# Patient Record
Sex: FEMALE | Race: WHITE | Employment: OTHER
[De-identification: names, ages, dates, MRNs, and addresses within clinical notes are randomized per-mention and may not be internally consistent; named-entity substitution may affect disease eponyms.]

---

## 2017-02-10 ENCOUNTER — OFFICE VISIT (OUTPATIENT)
Dept: SURGERY | Facility: CLINIC | Age: 67
End: 2017-02-10

## 2017-02-10 ENCOUNTER — TELEPHONE (OUTPATIENT)
Dept: INFUSION THERAPY | Facility: MEDICAL CENTER | Age: 67
End: 2017-02-10

## 2017-02-10 VITALS
HEART RATE: 80 BPM | HEIGHT: 60 IN | DIASTOLIC BLOOD PRESSURE: 75 MMHG | RESPIRATION RATE: 18 BRPM | BODY MASS INDEX: 26.5 KG/M2 | SYSTOLIC BLOOD PRESSURE: 127 MMHG | WEIGHT: 135 LBS | OXYGEN SATURATION: 99 % | TEMPERATURE: 98.4 F

## 2017-02-10 DIAGNOSIS — Z01.818 PREOP TESTING: Primary | ICD-10-CM

## 2017-02-10 DIAGNOSIS — C50.912 INVASIVE DUCTAL CARCINOMA OF BREAST, LEFT (HCC): ICD-10-CM

## 2017-02-10 PROCEDURE — G8420 CALC BMI NORM PARAMETERS: HCPCS | Performed by: SURGERY

## 2017-02-10 PROCEDURE — 1090F PRES/ABSN URINE INCON ASSESS: CPT | Performed by: SURGERY

## 2017-02-10 PROCEDURE — 4040F PNEUMOC VAC/ADMIN/RCVD: CPT | Performed by: SURGERY

## 2017-02-10 PROCEDURE — 1123F ACP DISCUSS/DSCN MKR DOCD: CPT | Performed by: SURGERY

## 2017-02-10 PROCEDURE — 3017F COLORECTAL CA SCREEN DOC REV: CPT | Performed by: SURGERY

## 2017-02-10 PROCEDURE — 99203 OFFICE O/P NEW LOW 30 MIN: CPT | Performed by: SURGERY

## 2017-02-10 PROCEDURE — 3014F SCREEN MAMMO DOC REV: CPT | Performed by: SURGERY

## 2017-02-10 PROCEDURE — G8484 FLU IMMUNIZE NO ADMIN: HCPCS | Performed by: SURGERY

## 2017-02-10 PROCEDURE — 1036F TOBACCO NON-USER: CPT | Performed by: SURGERY

## 2017-02-10 PROCEDURE — G8427 DOCREV CUR MEDS BY ELIG CLIN: HCPCS | Performed by: SURGERY

## 2017-02-10 PROCEDURE — G8400 PT W/DXA NO RESULTS DOC: HCPCS | Performed by: SURGERY

## 2017-02-15 ENCOUNTER — OFFICE VISIT (OUTPATIENT)
Dept: ONCOLOGY | Facility: CLINIC | Age: 67
End: 2017-02-15

## 2017-02-15 VITALS
SYSTOLIC BLOOD PRESSURE: 142 MMHG | BODY MASS INDEX: 23.05 KG/M2 | HEART RATE: 78 BPM | RESPIRATION RATE: 16 BRPM | TEMPERATURE: 97.4 F | DIASTOLIC BLOOD PRESSURE: 75 MMHG | WEIGHT: 135 LBS | HEIGHT: 64 IN

## 2017-02-15 DIAGNOSIS — C50.412 MALIGNANT NEOPLASM OF UPPER-OUTER QUADRANT OF LEFT FEMALE BREAST (HCC): ICD-10-CM

## 2017-02-15 PROCEDURE — G8427 DOCREV CUR MEDS BY ELIG CLIN: HCPCS | Performed by: INTERNAL MEDICINE

## 2017-02-15 PROCEDURE — 4040F PNEUMOC VAC/ADMIN/RCVD: CPT | Performed by: INTERNAL MEDICINE

## 2017-02-15 PROCEDURE — 1090F PRES/ABSN URINE INCON ASSESS: CPT | Performed by: INTERNAL MEDICINE

## 2017-02-15 PROCEDURE — 99205 OFFICE O/P NEW HI 60 MIN: CPT | Performed by: INTERNAL MEDICINE

## 2017-02-15 PROCEDURE — G8420 CALC BMI NORM PARAMETERS: HCPCS | Performed by: INTERNAL MEDICINE

## 2017-02-15 PROCEDURE — G8484 FLU IMMUNIZE NO ADMIN: HCPCS | Performed by: INTERNAL MEDICINE

## 2017-02-15 PROCEDURE — 1036F TOBACCO NON-USER: CPT | Performed by: INTERNAL MEDICINE

## 2017-02-15 PROCEDURE — 3014F SCREEN MAMMO DOC REV: CPT | Performed by: INTERNAL MEDICINE

## 2017-02-15 PROCEDURE — 3017F COLORECTAL CA SCREEN DOC REV: CPT | Performed by: INTERNAL MEDICINE

## 2017-03-14 ENCOUNTER — OFFICE VISIT (OUTPATIENT)
Dept: SURGERY | Age: 67
End: 2017-03-14

## 2017-03-14 VITALS
HEIGHT: 64 IN | DIASTOLIC BLOOD PRESSURE: 77 MMHG | SYSTOLIC BLOOD PRESSURE: 137 MMHG | TEMPERATURE: 97.5 F | BODY MASS INDEX: 23.39 KG/M2 | OXYGEN SATURATION: 99 % | RESPIRATION RATE: 18 BRPM | HEART RATE: 88 BPM | WEIGHT: 137 LBS

## 2017-03-14 DIAGNOSIS — Z09 POSTOP CHECK: Primary | ICD-10-CM

## 2017-03-14 PROCEDURE — 99024 POSTOP FOLLOW-UP VISIT: CPT | Performed by: SURGERY

## 2017-03-15 ENCOUNTER — OFFICE VISIT (OUTPATIENT)
Dept: ONCOLOGY | Age: 67
End: 2017-03-15
Payer: MEDICARE

## 2017-03-15 VITALS
HEIGHT: 64 IN | DIASTOLIC BLOOD PRESSURE: 69 MMHG | RESPIRATION RATE: 16 BRPM | BODY MASS INDEX: 23.39 KG/M2 | HEART RATE: 79 BPM | TEMPERATURE: 98.5 F | WEIGHT: 137 LBS | SYSTOLIC BLOOD PRESSURE: 130 MMHG

## 2017-03-15 DIAGNOSIS — C50.412 MALIGNANT NEOPLASM OF UPPER-OUTER QUADRANT OF LEFT FEMALE BREAST (HCC): Primary | ICD-10-CM

## 2017-03-15 PROCEDURE — G8400 PT W/DXA NO RESULTS DOC: HCPCS | Performed by: INTERNAL MEDICINE

## 2017-03-15 PROCEDURE — 1036F TOBACCO NON-USER: CPT | Performed by: INTERNAL MEDICINE

## 2017-03-15 PROCEDURE — 1123F ACP DISCUSS/DSCN MKR DOCD: CPT | Performed by: INTERNAL MEDICINE

## 2017-03-15 PROCEDURE — 1090F PRES/ABSN URINE INCON ASSESS: CPT | Performed by: INTERNAL MEDICINE

## 2017-03-15 PROCEDURE — G8420 CALC BMI NORM PARAMETERS: HCPCS | Performed by: INTERNAL MEDICINE

## 2017-03-15 PROCEDURE — 3014F SCREEN MAMMO DOC REV: CPT | Performed by: INTERNAL MEDICINE

## 2017-03-15 PROCEDURE — 4040F PNEUMOC VAC/ADMIN/RCVD: CPT | Performed by: INTERNAL MEDICINE

## 2017-03-15 PROCEDURE — 99214 OFFICE O/P EST MOD 30 MIN: CPT | Performed by: INTERNAL MEDICINE

## 2017-03-15 PROCEDURE — G8484 FLU IMMUNIZE NO ADMIN: HCPCS | Performed by: INTERNAL MEDICINE

## 2017-03-15 PROCEDURE — G8427 DOCREV CUR MEDS BY ELIG CLIN: HCPCS | Performed by: INTERNAL MEDICINE

## 2017-03-15 PROCEDURE — 3017F COLORECTAL CA SCREEN DOC REV: CPT | Performed by: INTERNAL MEDICINE

## 2017-03-15 RX ORDER — DIPHENHYDRAMINE HCL 25 MG
25 TABLET ORAL NIGHTLY PRN
COMMUNITY
End: 2017-04-05 | Stop reason: ALTCHOICE

## 2017-03-16 ENCOUNTER — TELEPHONE (OUTPATIENT)
Dept: SURGERY | Age: 67
End: 2017-03-16

## 2017-03-21 ENCOUNTER — OFFICE VISIT (OUTPATIENT)
Dept: SURGERY | Age: 67
End: 2017-03-21

## 2017-03-21 VITALS
TEMPERATURE: 97.2 F | BODY MASS INDEX: 23.47 KG/M2 | OXYGEN SATURATION: 95 % | SYSTOLIC BLOOD PRESSURE: 131 MMHG | HEIGHT: 64 IN | DIASTOLIC BLOOD PRESSURE: 80 MMHG | HEART RATE: 83 BPM | WEIGHT: 137.5 LBS | RESPIRATION RATE: 18 BRPM

## 2017-03-21 DIAGNOSIS — Z09 POSTOP CHECK: Primary | ICD-10-CM

## 2017-03-21 PROCEDURE — 99024 POSTOP FOLLOW-UP VISIT: CPT | Performed by: SURGERY

## 2017-03-28 ENCOUNTER — TELEPHONE (OUTPATIENT)
Dept: ONCOLOGY | Age: 67
End: 2017-03-28

## 2017-03-28 DIAGNOSIS — C50.412 MALIGNANT NEOPLASM OF UPPER-OUTER QUADRANT OF LEFT FEMALE BREAST (HCC): Primary | ICD-10-CM

## 2017-04-05 ENCOUNTER — OFFICE VISIT (OUTPATIENT)
Dept: ONCOLOGY | Age: 67
End: 2017-04-05
Payer: MEDICARE

## 2017-04-05 VITALS
SYSTOLIC BLOOD PRESSURE: 146 MMHG | RESPIRATION RATE: 20 BRPM | WEIGHT: 136 LBS | HEART RATE: 83 BPM | HEIGHT: 64 IN | BODY MASS INDEX: 23.22 KG/M2 | TEMPERATURE: 98.2 F | DIASTOLIC BLOOD PRESSURE: 74 MMHG

## 2017-04-05 DIAGNOSIS — M85.80 OSTEOPENIA: ICD-10-CM

## 2017-04-05 DIAGNOSIS — M81.0 AGE-RELATED OSTEOPOROSIS WITHOUT CURRENT PATHOLOGICAL FRACTURE: ICD-10-CM

## 2017-04-05 DIAGNOSIS — C50.412 MALIGNANT NEOPLASM OF UPPER-OUTER QUADRANT OF LEFT FEMALE BREAST (HCC): Primary | ICD-10-CM

## 2017-04-05 PROCEDURE — 3014F SCREEN MAMMO DOC REV: CPT | Performed by: INTERNAL MEDICINE

## 2017-04-05 PROCEDURE — 1090F PRES/ABSN URINE INCON ASSESS: CPT | Performed by: INTERNAL MEDICINE

## 2017-04-05 PROCEDURE — G8427 DOCREV CUR MEDS BY ELIG CLIN: HCPCS | Performed by: INTERNAL MEDICINE

## 2017-04-05 PROCEDURE — 99214 OFFICE O/P EST MOD 30 MIN: CPT | Performed by: INTERNAL MEDICINE

## 2017-04-05 PROCEDURE — 4005F PHARM THX FOR OP RXD: CPT | Performed by: INTERNAL MEDICINE

## 2017-04-05 PROCEDURE — 1036F TOBACCO NON-USER: CPT | Performed by: INTERNAL MEDICINE

## 2017-04-05 PROCEDURE — G8400 PT W/DXA NO RESULTS DOC: HCPCS | Performed by: INTERNAL MEDICINE

## 2017-04-05 PROCEDURE — 3017F COLORECTAL CA SCREEN DOC REV: CPT | Performed by: INTERNAL MEDICINE

## 2017-04-05 PROCEDURE — 4040F PNEUMOC VAC/ADMIN/RCVD: CPT | Performed by: INTERNAL MEDICINE

## 2017-04-05 PROCEDURE — 1123F ACP DISCUSS/DSCN MKR DOCD: CPT | Performed by: INTERNAL MEDICINE

## 2017-04-05 PROCEDURE — G8420 CALC BMI NORM PARAMETERS: HCPCS | Performed by: INTERNAL MEDICINE

## 2017-04-05 RX ORDER — ANASTROZOLE 1 MG/1
1 TABLET ORAL DAILY
Qty: 30 TABLET | Refills: 11 | Status: SHIPPED | OUTPATIENT
Start: 2017-04-05 | End: 2019-03-23 | Stop reason: SDUPTHER

## 2017-04-12 ENCOUNTER — HOSPITAL ENCOUNTER (OUTPATIENT)
Dept: BONE DENSITY | Age: 67
Discharge: HOME | End: 2017-04-12
Payer: MEDICARE

## 2017-04-12 DIAGNOSIS — M81.0 AGE-RELATED OSTEOPOROSIS WITHOUT CURRENT PATHOLOGICAL FRACTURE: ICD-10-CM

## 2017-04-12 DIAGNOSIS — M85.80 OSTEOPENIA: ICD-10-CM

## 2017-04-12 DIAGNOSIS — C50.412 MALIGNANT NEOPLASM OF UPPER-OUTER QUADRANT OF LEFT FEMALE BREAST (HCC): ICD-10-CM

## 2017-04-12 PROCEDURE — 77080 DXA BONE DENSITY AXIAL: CPT

## 2017-04-18 ENCOUNTER — OFFICE VISIT (OUTPATIENT)
Dept: SURGERY | Age: 67
End: 2017-04-18

## 2017-04-18 VITALS
DIASTOLIC BLOOD PRESSURE: 74 MMHG | HEART RATE: 80 BPM | SYSTOLIC BLOOD PRESSURE: 123 MMHG | BODY MASS INDEX: 23.52 KG/M2 | TEMPERATURE: 98 F | RESPIRATION RATE: 18 BRPM | OXYGEN SATURATION: 99 % | WEIGHT: 137.8 LBS | HEIGHT: 64 IN

## 2017-04-18 DIAGNOSIS — Z09 POSTOP CHECK: Primary | ICD-10-CM

## 2017-04-18 DIAGNOSIS — C50.912 INVASIVE DUCTAL CARCINOMA OF BREAST, LEFT (HCC): ICD-10-CM

## 2017-04-18 PROCEDURE — 99024 POSTOP FOLLOW-UP VISIT: CPT | Performed by: SURGERY

## 2017-06-07 ENCOUNTER — OFFICE VISIT (OUTPATIENT)
Dept: ONCOLOGY | Age: 67
End: 2017-06-07
Payer: MEDICARE

## 2017-06-07 VITALS
DIASTOLIC BLOOD PRESSURE: 73 MMHG | HEART RATE: 78 BPM | HEIGHT: 64 IN | WEIGHT: 139 LBS | BODY MASS INDEX: 23.73 KG/M2 | RESPIRATION RATE: 16 BRPM | TEMPERATURE: 97.6 F | SYSTOLIC BLOOD PRESSURE: 144 MMHG

## 2017-06-07 DIAGNOSIS — C50.412 MALIGNANT NEOPLASM OF UPPER-OUTER QUADRANT OF LEFT FEMALE BREAST (HCC): Primary | ICD-10-CM

## 2017-06-07 PROCEDURE — 1090F PRES/ABSN URINE INCON ASSESS: CPT | Performed by: INTERNAL MEDICINE

## 2017-06-07 PROCEDURE — G8420 CALC BMI NORM PARAMETERS: HCPCS | Performed by: INTERNAL MEDICINE

## 2017-06-07 PROCEDURE — G8399 PT W/DXA RESULTS DOCUMENT: HCPCS | Performed by: INTERNAL MEDICINE

## 2017-06-07 PROCEDURE — 1036F TOBACCO NON-USER: CPT | Performed by: INTERNAL MEDICINE

## 2017-06-07 PROCEDURE — 3014F SCREEN MAMMO DOC REV: CPT | Performed by: INTERNAL MEDICINE

## 2017-06-07 PROCEDURE — G8427 DOCREV CUR MEDS BY ELIG CLIN: HCPCS | Performed by: INTERNAL MEDICINE

## 2017-06-07 PROCEDURE — 3017F COLORECTAL CA SCREEN DOC REV: CPT | Performed by: INTERNAL MEDICINE

## 2017-06-07 PROCEDURE — 4040F PNEUMOC VAC/ADMIN/RCVD: CPT | Performed by: INTERNAL MEDICINE

## 2017-06-07 PROCEDURE — 99214 OFFICE O/P EST MOD 30 MIN: CPT | Performed by: INTERNAL MEDICINE

## 2017-06-07 PROCEDURE — 1123F ACP DISCUSS/DSCN MKR DOCD: CPT | Performed by: INTERNAL MEDICINE

## 2017-06-07 RX ORDER — CIPROFLOXACIN 500 MG/1
500 TABLET, FILM COATED ORAL 2 TIMES DAILY
COMMUNITY
End: 2017-06-29 | Stop reason: ALTCHOICE

## 2017-09-06 ENCOUNTER — OFFICE VISIT (OUTPATIENT)
Dept: ONCOLOGY | Age: 67
End: 2017-09-06
Payer: MEDICARE

## 2017-09-06 VITALS
HEIGHT: 64 IN | SYSTOLIC BLOOD PRESSURE: 130 MMHG | TEMPERATURE: 97.5 F | HEART RATE: 80 BPM | WEIGHT: 140.8 LBS | BODY MASS INDEX: 24.04 KG/M2 | RESPIRATION RATE: 16 BRPM | DIASTOLIC BLOOD PRESSURE: 73 MMHG

## 2017-09-06 DIAGNOSIS — C50.412 MALIGNANT NEOPLASM OF UPPER-OUTER QUADRANT OF LEFT FEMALE BREAST (HCC): Primary | ICD-10-CM

## 2017-09-06 PROCEDURE — 3017F COLORECTAL CA SCREEN DOC REV: CPT | Performed by: INTERNAL MEDICINE

## 2017-09-06 PROCEDURE — G8399 PT W/DXA RESULTS DOCUMENT: HCPCS | Performed by: INTERNAL MEDICINE

## 2017-09-06 PROCEDURE — G8427 DOCREV CUR MEDS BY ELIG CLIN: HCPCS | Performed by: INTERNAL MEDICINE

## 2017-09-06 PROCEDURE — 1036F TOBACCO NON-USER: CPT | Performed by: INTERNAL MEDICINE

## 2017-09-06 PROCEDURE — G8420 CALC BMI NORM PARAMETERS: HCPCS | Performed by: INTERNAL MEDICINE

## 2017-09-06 PROCEDURE — 4040F PNEUMOC VAC/ADMIN/RCVD: CPT | Performed by: INTERNAL MEDICINE

## 2017-09-06 PROCEDURE — 1123F ACP DISCUSS/DSCN MKR DOCD: CPT | Performed by: INTERNAL MEDICINE

## 2017-09-06 PROCEDURE — 1090F PRES/ABSN URINE INCON ASSESS: CPT | Performed by: INTERNAL MEDICINE

## 2017-09-06 PROCEDURE — 3014F SCREEN MAMMO DOC REV: CPT | Performed by: INTERNAL MEDICINE

## 2017-09-06 PROCEDURE — 99214 OFFICE O/P EST MOD 30 MIN: CPT | Performed by: INTERNAL MEDICINE

## 2017-09-21 ENCOUNTER — HOSPITAL ENCOUNTER (OUTPATIENT)
Dept: WOMENS IMAGING | Age: 67
Discharge: HOME | End: 2017-09-21
Payer: MEDICARE

## 2017-09-21 DIAGNOSIS — C50.412 MALIGNANT NEOPLASM OF UPPER-OUTER QUADRANT OF LEFT FEMALE BREAST (HCC): ICD-10-CM

## 2017-09-21 PROCEDURE — G0206 DX MAMMO INCL CAD UNI: HCPCS

## 2017-12-15 ENCOUNTER — OFFICE VISIT (OUTPATIENT)
Dept: ONCOLOGY | Age: 67
End: 2017-12-15
Payer: MEDICARE

## 2017-12-15 VITALS
BODY MASS INDEX: 23.05 KG/M2 | TEMPERATURE: 97.6 F | DIASTOLIC BLOOD PRESSURE: 70 MMHG | HEART RATE: 73 BPM | HEIGHT: 64 IN | RESPIRATION RATE: 16 BRPM | SYSTOLIC BLOOD PRESSURE: 135 MMHG | WEIGHT: 135 LBS

## 2017-12-15 DIAGNOSIS — C50.412 MALIGNANT NEOPLASM OF UPPER-OUTER QUADRANT OF LEFT BREAST IN FEMALE, ESTROGEN RECEPTOR POSITIVE (HCC): Primary | ICD-10-CM

## 2017-12-15 DIAGNOSIS — Z17.0 MALIGNANT NEOPLASM OF UPPER-OUTER QUADRANT OF LEFT BREAST IN FEMALE, ESTROGEN RECEPTOR POSITIVE (HCC): Primary | ICD-10-CM

## 2017-12-15 PROCEDURE — 3017F COLORECTAL CA SCREEN DOC REV: CPT | Performed by: INTERNAL MEDICINE

## 2017-12-15 PROCEDURE — 99214 OFFICE O/P EST MOD 30 MIN: CPT | Performed by: INTERNAL MEDICINE

## 2017-12-15 PROCEDURE — G8484 FLU IMMUNIZE NO ADMIN: HCPCS | Performed by: INTERNAL MEDICINE

## 2017-12-15 PROCEDURE — 3014F SCREEN MAMMO DOC REV: CPT | Performed by: INTERNAL MEDICINE

## 2017-12-15 PROCEDURE — 1036F TOBACCO NON-USER: CPT | Performed by: INTERNAL MEDICINE

## 2017-12-15 PROCEDURE — 1090F PRES/ABSN URINE INCON ASSESS: CPT | Performed by: INTERNAL MEDICINE

## 2017-12-15 PROCEDURE — 4040F PNEUMOC VAC/ADMIN/RCVD: CPT | Performed by: INTERNAL MEDICINE

## 2017-12-15 PROCEDURE — G8399 PT W/DXA RESULTS DOCUMENT: HCPCS | Performed by: INTERNAL MEDICINE

## 2017-12-15 PROCEDURE — G8427 DOCREV CUR MEDS BY ELIG CLIN: HCPCS | Performed by: INTERNAL MEDICINE

## 2017-12-15 PROCEDURE — G8420 CALC BMI NORM PARAMETERS: HCPCS | Performed by: INTERNAL MEDICINE

## 2017-12-15 PROCEDURE — 1123F ACP DISCUSS/DSCN MKR DOCD: CPT | Performed by: INTERNAL MEDICINE

## 2017-12-15 RX ORDER — LEVOTHYROXINE SODIUM 112 UG/1
1 TABLET ORAL DAILY
COMMUNITY
Start: 2017-12-13 | End: 2019-10-30 | Stop reason: ALTCHOICE

## 2017-12-15 RX ORDER — VENLAFAXINE 50 MG/1
25 TABLET ORAL 3 TIMES DAILY
COMMUNITY
Start: 2017-10-11

## 2017-12-15 NOTE — PROGRESS NOTES
Chief Complaint   Patient presents with    Breast Cancer     DIAGNOSIS:        Stage TIa invasive ductal carcinoma of the left breast with areas of DCIS, ER positive, MT negative. Status post lumpectomy with close surgical margins. CURRENT THERAPY:         Left breast lumpectomy and sentinel lymph node biopsy on  2017. Radiation therapy completed 5/10/17  Arimidex started 5/15/17    BRIEF CASE HISTORY:      Sonam Meraz is a very pleasant 78 y.o. female who is referred to us for management recommendation regarding her breast cancer. She did not have a mammogram since . This year and under the insistence of Dr. Francoise Carrington, she went for bilateral screening mammogram.  There was some density in both breasts but there was some calcification that is new in the retroareolar aspect of the left breast.  Diagnostic mammogram was done on  17 And showed scattered calcification at the 2:00 position of the left breast.  Image guided biopsy was done on 17 and showed invasive ductal carcinoma grade 1 measuring 2 mm,  ER positive MT negative and HER-2/fracisco could not be done because of the sample size. She had left breast lumpectomy and sentinel lymph node biopsy on 2017. INTERIM HISTORY:   The patient is seen for follow-up breast cancer. Doing well clinically. No lumps or masses. No arm swelling. No infections or bleeding. No breathing problems. No other complaints. Maintained on Arimidex. Tolerated well. No side effects. No hot flashes. No back or bony aches. Menarche 16, menopause 44 hysterectomy for endometriosis. +0. No HRT. PAST MEDICAL HISTORY: has a past medical history of Breast cancer ([address] Depression; Diabetes mellitus ([address] Endometriosis; Hyperlipidemia; Hypertension; and Hypothyroidism. PAST SURGICAL HISTORY: has a past surgical history that includes Appendectomy (); Tonsillectomy (); Colonoscopy ();  Breast lumpectomy (Left,

## 2018-03-22 ENCOUNTER — HOSPITAL ENCOUNTER (OUTPATIENT)
Dept: WOMENS IMAGING | Age: 68
Discharge: HOME | End: 2018-03-24
Payer: MEDICARE

## 2018-03-22 DIAGNOSIS — C50.412 MALIGNANT NEOPLASM OF UPPER-OUTER QUADRANT OF LEFT BREAST IN FEMALE, ESTROGEN RECEPTOR POSITIVE (HCC): ICD-10-CM

## 2018-03-22 DIAGNOSIS — Z17.0 MALIGNANT NEOPLASM OF UPPER-OUTER QUADRANT OF LEFT BREAST IN FEMALE, ESTROGEN RECEPTOR POSITIVE (HCC): ICD-10-CM

## 2018-03-22 PROCEDURE — 77066 DX MAMMO INCL CAD BI: CPT

## 2018-04-20 ENCOUNTER — OFFICE VISIT (OUTPATIENT)
Dept: ONCOLOGY | Age: 68
End: 2018-04-20
Payer: MEDICARE

## 2018-04-20 VITALS
SYSTOLIC BLOOD PRESSURE: 124 MMHG | RESPIRATION RATE: 16 BRPM | WEIGHT: 146.2 LBS | BODY MASS INDEX: 24.96 KG/M2 | TEMPERATURE: 98.4 F | DIASTOLIC BLOOD PRESSURE: 69 MMHG | HEIGHT: 64 IN | HEART RATE: 78 BPM

## 2018-04-20 DIAGNOSIS — Z17.0 MALIGNANT NEOPLASM OF UPPER-OUTER QUADRANT OF LEFT BREAST IN FEMALE, ESTROGEN RECEPTOR POSITIVE (HCC): Primary | ICD-10-CM

## 2018-04-20 DIAGNOSIS — C50.412 MALIGNANT NEOPLASM OF UPPER-OUTER QUADRANT OF LEFT BREAST IN FEMALE, ESTROGEN RECEPTOR POSITIVE (HCC): Primary | ICD-10-CM

## 2018-04-20 PROCEDURE — G8419 CALC BMI OUT NRM PARAM NOF/U: HCPCS | Performed by: INTERNAL MEDICINE

## 2018-04-20 PROCEDURE — 1036F TOBACCO NON-USER: CPT | Performed by: INTERNAL MEDICINE

## 2018-04-20 PROCEDURE — G8399 PT W/DXA RESULTS DOCUMENT: HCPCS | Performed by: INTERNAL MEDICINE

## 2018-04-20 PROCEDURE — 99214 OFFICE O/P EST MOD 30 MIN: CPT | Performed by: INTERNAL MEDICINE

## 2018-04-20 PROCEDURE — G8427 DOCREV CUR MEDS BY ELIG CLIN: HCPCS | Performed by: INTERNAL MEDICINE

## 2018-04-20 PROCEDURE — 1123F ACP DISCUSS/DSCN MKR DOCD: CPT | Performed by: INTERNAL MEDICINE

## 2018-04-20 PROCEDURE — 3014F SCREEN MAMMO DOC REV: CPT | Performed by: INTERNAL MEDICINE

## 2018-04-20 PROCEDURE — 1090F PRES/ABSN URINE INCON ASSESS: CPT | Performed by: INTERNAL MEDICINE

## 2018-04-20 PROCEDURE — 4040F PNEUMOC VAC/ADMIN/RCVD: CPT | Performed by: INTERNAL MEDICINE

## 2018-04-20 PROCEDURE — 3017F COLORECTAL CA SCREEN DOC REV: CPT | Performed by: INTERNAL MEDICINE

## 2018-09-24 ENCOUNTER — HOSPITAL ENCOUNTER (OUTPATIENT)
Dept: WOMENS IMAGING | Age: 68
Discharge: HOME | End: 2018-09-26
Payer: MEDICARE

## 2018-09-24 DIAGNOSIS — C50.412 MALIGNANT NEOPLASM OF UPPER-OUTER QUADRANT OF LEFT BREAST IN FEMALE, ESTROGEN RECEPTOR POSITIVE (HCC): ICD-10-CM

## 2018-09-24 DIAGNOSIS — Z17.0 MALIGNANT NEOPLASM OF UPPER-OUTER QUADRANT OF LEFT BREAST IN FEMALE, ESTROGEN RECEPTOR POSITIVE (HCC): ICD-10-CM

## 2018-09-24 PROCEDURE — 77065 DX MAMMO INCL CAD UNI: CPT

## 2018-10-24 ENCOUNTER — OFFICE VISIT (OUTPATIENT)
Dept: ONCOLOGY | Age: 68
End: 2018-10-24
Payer: MEDICARE

## 2018-10-24 VITALS
WEIGHT: 146.4 LBS | HEART RATE: 80 BPM | HEIGHT: 64 IN | TEMPERATURE: 97.1 F | BODY MASS INDEX: 25 KG/M2 | SYSTOLIC BLOOD PRESSURE: 149 MMHG | DIASTOLIC BLOOD PRESSURE: 72 MMHG

## 2018-10-24 DIAGNOSIS — C50.412 MALIGNANT NEOPLASM OF UPPER-OUTER QUADRANT OF LEFT BREAST IN FEMALE, ESTROGEN RECEPTOR POSITIVE (HCC): Primary | ICD-10-CM

## 2018-10-24 DIAGNOSIS — Z17.0 MALIGNANT NEOPLASM OF UPPER-OUTER QUADRANT OF LEFT BREAST IN FEMALE, ESTROGEN RECEPTOR POSITIVE (HCC): Primary | ICD-10-CM

## 2018-10-24 DIAGNOSIS — M81.0 AGE-RELATED OSTEOPOROSIS WITHOUT CURRENT PATHOLOGICAL FRACTURE: ICD-10-CM

## 2018-10-24 PROCEDURE — 1123F ACP DISCUSS/DSCN MKR DOCD: CPT | Performed by: INTERNAL MEDICINE

## 2018-10-24 PROCEDURE — G8484 FLU IMMUNIZE NO ADMIN: HCPCS | Performed by: INTERNAL MEDICINE

## 2018-10-24 PROCEDURE — 1090F PRES/ABSN URINE INCON ASSESS: CPT | Performed by: INTERNAL MEDICINE

## 2018-10-24 PROCEDURE — G8427 DOCREV CUR MEDS BY ELIG CLIN: HCPCS | Performed by: INTERNAL MEDICINE

## 2018-10-24 PROCEDURE — 1101F PT FALLS ASSESS-DOCD LE1/YR: CPT | Performed by: INTERNAL MEDICINE

## 2018-10-24 PROCEDURE — G8419 CALC BMI OUT NRM PARAM NOF/U: HCPCS | Performed by: INTERNAL MEDICINE

## 2018-10-24 PROCEDURE — 99214 OFFICE O/P EST MOD 30 MIN: CPT | Performed by: INTERNAL MEDICINE

## 2018-10-24 PROCEDURE — G8399 PT W/DXA RESULTS DOCUMENT: HCPCS | Performed by: INTERNAL MEDICINE

## 2018-10-24 PROCEDURE — 3017F COLORECTAL CA SCREEN DOC REV: CPT | Performed by: INTERNAL MEDICINE

## 2018-10-24 PROCEDURE — 1036F TOBACCO NON-USER: CPT | Performed by: INTERNAL MEDICINE

## 2018-10-24 PROCEDURE — 3014F SCREEN MAMMO DOC REV: CPT | Performed by: INTERNAL MEDICINE

## 2018-10-24 PROCEDURE — 4040F PNEUMOC VAC/ADMIN/RCVD: CPT | Performed by: INTERNAL MEDICINE

## 2018-10-24 NOTE — PROGRESS NOTES
Chief Complaint   Patient presents with    Breast Cancer     DIAGNOSIS:        Stage TIa invasive ductal carcinoma of the left breast with areas of DCIS, ER positive, NJ negative. Status post lumpectomy with close surgical margins. CURRENT THERAPY:         Left breast lumpectomy and sentinel lymph node biopsy on  2017. Radiation therapy completed 5/10/17  Arimidex started 5/15/17    BRIEF CASE HISTORY:      Sary Auguste is a very pleasant 80 y.o. female who is referred to us for management recommendation regarding her breast cancer. She did not have a mammogram since . This year and under the insistence of Dr. Logan Sever, she went for bilateral screening mammogram.  There was some density in both breasts but there was some calcification that is new in the retroareolar aspect of the left breast.  Diagnostic mammogram was done on  17 And showed scattered calcification at the 2:00 position of the left breast.  Image guided biopsy was done on 17 and showed invasive ductal carcinoma grade 1 measuring 2 mm,  ER positive NJ negative and HER-2/fracisco could not be done because of the sample size. She had left breast lumpectomy and sentinel lymph node biopsy on 2017. INTERIM HISTORY:   The patient seen for follow-up this cancer. Doing well clinically. She has no lumps or bumps. No palpable lymph nodes. No lymphedema. She is maintained on Arimidex. Tolerated well. No hot flashes. No bone aches. No other side effects. Menarche 16, menopause 44 hysterectomy for endometriosis. +0. No HRT. PAST MEDICAL HISTORY: has a past medical history of Breast cancer ([address] Depression; Diabetes mellitus ([address] Endometriosis; Hyperlipidemia; Hypertension; and Hypothyroidism. PAST SURGICAL HISTORY: has a past surgical history that includes Appendectomy (); Tonsillectomy (); Colonoscopy (); Breast lumpectomy (Left, 2017);  Hysterectomy, vaginal (); Cholecystectomy, laparoscopic (1994); and Cardiac catheterization (2005). CURRENT MEDICATIONS:  has a current medication list which includes the following prescription(s): atorvastatin, levothyroxine, venlafaxine, anastrozole, multiple vitamins-minerals, losartan, and aspirin. ALLERGIES:  is allergic to tape [adhesive tape]; daypro [oxaprozin]; pcn [penicillins]; prilosec [omeprazole]; strawberry extract; clindamycin/lincomycin; and vancomycin. FAMILY HISTORY: Negative for any hematological or oncological conditions. SOCIAL HISTORY:  reports that she has never smoked. She has never used smokeless tobacco. She reports that she does not drink alcohol or use drugs. REVIEW OF SYSTEMS:   General: no fever or night sweats, Weight is stable. ENT: No double or blurred vision, no tinnitus or hearing problem, no dysphagia or sore throat   Respiratory: No chest pain, no shortness of breath, no cough or hemoptysis. Cardiovascular: Denies chest pain, PND or orthopnea. No L E swelling or palpitations. Gastrointestinal:    No nausea or vomiting, abdominal pain, diarrhea or constipation. Genitourinary: Denies dysuria, hematuria, frequency, urgency or incontinence. Neurological: Denies headaches, decreased LOC, no sensory or motor focal deficits. Musculoskeletal:  No arthralgia no back pain or joint swelling. Skin: There are no rashes or bleeding. Psychiatric:   she has history of psychiatric illness but lately things have been controlled. Endocrine: no diabetes or thyroid disease. Hematologic: no bleeding , no adenopathy. PHYSICAL EXAM: Shows a well appearing 80y.o.-year-old female who is not in pain or distress. Vital Signs: Blood pressure (!) 149/72, pulse 80, temperature 97.1 °F (36.2 °C), temperature source Temporal, height 5' 4\" (1.626 m), weight 146 lb 6.4 oz (66.4 kg), not currently breastfeeding. HEENT: Normocephalic and atraumatic.  Pupils are equal, round, reactive to light and accommodation. Extraocular muscles are intact. Neck: Showed no JVD, no carotid bruit . Lungs: Clear to auscultation bilaterally. Heart: Regular without any murmur. Abdomen: Soft, nontender. No hepatosplenomegaly. Extremities: Lower extremities show no edema, clubbing, or cyanosis. Breasts: Left-sided status post lumpectomy and sentinel lymph node biopsy. No adenopathy was appreciated. Right breast is free of any masses or adenopathy. Neuro exam: intact cranial nerves bilaterally no motor or sensory deficit, gait is normal. Lymphatic: no adenopathy appreciated in the supraclavicular, axillary, cervical or inguinal area    Review of radiological studies:   diag mamm 1/17/17  FINDINGS: Correlated with screening mammogram 1/10/2017.  Left breast is heterogeneously dense. Mireille Peoples is a group of heterogeneous appearing calcifications in the left breast confirmed with magnification views. Calcifications are approximately 2:00    anterior depth. There are a few benign scattered and vascular calcifications in the left breast.        Review of laboratory data:   Diagnosis --   LEFT BREAST, RETROAREOLAR, CORE NEEDLE BIOPSIES:            -  WELL-DIFFERENTIATED INVASIVE DUCTAL CARCINOMA, 1.2 MM   GREATEST DIMENSION, ARISING IN A PREDOMINANT BACKGROUND OF HIGH-GRADE   DUCTAL CARCINOMA IN SITU (COMEDO TYPE).          -  ESTROGEN RECEPTOR POSITIVE AND PROGESTERONE RECEPTOR   NEGATIVE IN INVASIVE TUMOR. Left breast lumpectomy March 8, 2017:  Collected: 3/8/2017   Received: 3/8/2017   Reported: 3/10/2017 15:40     -- Diagnosis --   1.  LYMPH NODE, LEFT, SENTINEL NODE BIOPSY:       -  ONE LYMPH NODE, NEGATIVE FOR MALIGNANCY (0/1). 2.  LEFT BREAST, LUMPECTOMY:            -  INTERMEDIATE GRADE DUCTAL CARCINOMA IN SITU WITH   CANCERIZATION OF LOBULES AND ADJACENT BIOPSY SITE REACTION.        -  NO RESIDUAL INVASIVE CARCINOMA PRESENT.       -  MARGINS APPEAR NARROWLY NEGATIVE FOR CARCINOMA IN SITU (SEE   MICROSCOPIC TEMPLATE FOR

## 2019-01-11 ENCOUNTER — HOSPITAL ENCOUNTER (OUTPATIENT)
Dept: LAB | Age: 69
Discharge: HOME | End: 2019-01-11
Payer: MEDICARE

## 2019-01-11 DIAGNOSIS — E11.9 CONTROLLED TYPE 2 DIABETES MELLITUS WITHOUT COMPLICATION, WITHOUT LONG-TERM CURRENT USE OF INSULIN (HCC): ICD-10-CM

## 2019-01-11 DIAGNOSIS — I10 ESSENTIAL HYPERTENSION, BENIGN: ICD-10-CM

## 2019-01-11 DIAGNOSIS — E03.9 HYPOTHYROIDISM, UNSPECIFIED TYPE: ICD-10-CM

## 2019-01-11 DIAGNOSIS — E78.2 MIXED HYPERLIPIDEMIA: ICD-10-CM

## 2019-01-11 LAB
ALT SERPL-CCNC: 34 U/L (ref 5–33)
ANION GAP SERPL CALCULATED.3IONS-SCNC: 12 MMOL/L (ref 9–17)
AST SERPL-CCNC: 32 U/L
BUN BLDV-MCNC: 11 MG/DL (ref 8–23)
BUN/CREAT BLD: 12 (ref 9–20)
CALCIUM SERPL-MCNC: 9.1 MG/DL (ref 8.6–10.4)
CHLORIDE BLD-SCNC: 100 MMOL/L (ref 98–107)
CHOLESTEROL/HDL RATIO: 2.9
CHOLESTEROL: 157 MG/DL
CO2: 29 MMOL/L (ref 20–31)
CREAT SERPL-MCNC: 0.9 MG/DL (ref 0.5–0.9)
CREATININE URINE: 95.2 MG/DL (ref 28–217)
ESTIMATED AVERAGE GLUCOSE: 140 MG/DL
GFR AFRICAN AMERICAN: >60 ML/MIN
GFR NON-AFRICAN AMERICAN: >60 ML/MIN
GFR SERPL CREATININE-BSD FRML MDRD: ABNORMAL ML/MIN/{1.73_M2}
GFR SERPL CREATININE-BSD FRML MDRD: ABNORMAL ML/MIN/{1.73_M2}
GLUCOSE BLD-MCNC: 127 MG/DL (ref 70–99)
HBA1C MFR BLD: 6.5 % (ref 4.8–5.9)
HCT VFR BLD CALC: 45.6 % (ref 36.3–47.1)
HDLC SERPL-MCNC: 54 MG/DL
HEMOGLOBIN: 15 G/DL (ref 11.9–15.1)
LDL CHOLESTEROL: 79 MG/DL (ref 0–130)
MCH RBC QN AUTO: 30.5 PG (ref 25.2–33.5)
MCHC RBC AUTO-ENTMCNC: 32.9 G/DL (ref 28.4–34.8)
MCV RBC AUTO: 92.9 FL (ref 82.6–102.9)
MICROALBUMIN/CREAT 24H UR: 25 MG/L
MICROALBUMIN/CREAT UR-RTO: 26 MCG/MG CREAT
NRBC AUTOMATED: 0 PER 100 WBC
PDW BLD-RTO: 11.7 % (ref 11.8–14.4)
PLATELET # BLD: 207 K/UL (ref 138–453)
PMV BLD AUTO: 9.5 FL (ref 8.1–13.5)
POTASSIUM SERPL-SCNC: 4.1 MMOL/L (ref 3.7–5.3)
RBC # BLD: 4.91 M/UL (ref 3.95–5.11)
SODIUM BLD-SCNC: 141 MMOL/L (ref 135–144)
T3 FREE: 2.67 PG/ML (ref 2.02–4.43)
THYROXINE, FREE: 1.36 NG/DL (ref 0.93–1.7)
TRIGL SERPL-MCNC: 118 MG/DL
TSH SERPL DL<=0.05 MIU/L-ACNC: 2.26 MIU/L (ref 0.3–5)
VLDLC SERPL CALC-MCNC: NORMAL MG/DL (ref 1–30)
WBC # BLD: 5.2 K/UL (ref 3.5–11.3)

## 2019-01-11 PROCEDURE — 84481 FREE ASSAY (FT-3): CPT

## 2019-01-11 PROCEDURE — 80048 BASIC METABOLIC PNL TOTAL CA: CPT

## 2019-01-11 PROCEDURE — 83036 HEMOGLOBIN GLYCOSYLATED A1C: CPT

## 2019-01-11 PROCEDURE — 80061 LIPID PANEL: CPT

## 2019-01-11 PROCEDURE — 84460 ALANINE AMINO (ALT) (SGPT): CPT

## 2019-01-11 PROCEDURE — 82570 ASSAY OF URINE CREATININE: CPT

## 2019-01-11 PROCEDURE — 85027 COMPLETE CBC AUTOMATED: CPT

## 2019-01-11 PROCEDURE — 82043 UR ALBUMIN QUANTITATIVE: CPT

## 2019-01-11 PROCEDURE — 84439 ASSAY OF FREE THYROXINE: CPT

## 2019-01-11 PROCEDURE — 84450 TRANSFERASE (AST) (SGOT): CPT

## 2019-01-11 PROCEDURE — 84443 ASSAY THYROID STIM HORMONE: CPT

## 2019-01-11 PROCEDURE — 36415 COLL VENOUS BLD VENIPUNCTURE: CPT

## 2019-03-25 RX ORDER — ANASTROZOLE 1 MG/1
TABLET ORAL
Qty: 30 TABLET | Refills: 11 | Status: SHIPPED | OUTPATIENT
Start: 2019-03-25 | End: 2019-10-30 | Stop reason: SDUPTHER

## 2019-04-15 ENCOUNTER — HOSPITAL ENCOUNTER (OUTPATIENT)
Dept: BONE DENSITY | Age: 69
Discharge: HOME | End: 2019-04-17
Payer: MEDICARE

## 2019-04-15 ENCOUNTER — HOSPITAL ENCOUNTER (OUTPATIENT)
Dept: WOMENS IMAGING | Age: 69
Discharge: HOME | End: 2019-04-17
Payer: MEDICARE

## 2019-04-15 DIAGNOSIS — Z17.0 MALIGNANT NEOPLASM OF UPPER-OUTER QUADRANT OF LEFT BREAST IN FEMALE, ESTROGEN RECEPTOR POSITIVE (HCC): ICD-10-CM

## 2019-04-15 DIAGNOSIS — C50.412 MALIGNANT NEOPLASM OF UPPER-OUTER QUADRANT OF LEFT BREAST IN FEMALE, ESTROGEN RECEPTOR POSITIVE (HCC): ICD-10-CM

## 2019-04-15 DIAGNOSIS — M81.0 AGE-RELATED OSTEOPOROSIS WITHOUT CURRENT PATHOLOGICAL FRACTURE: ICD-10-CM

## 2019-04-15 PROCEDURE — 77080 DXA BONE DENSITY AXIAL: CPT

## 2019-04-15 PROCEDURE — 77066 DX MAMMO INCL CAD BI: CPT

## 2019-04-17 ENCOUNTER — OFFICE VISIT (OUTPATIENT)
Dept: ONCOLOGY | Age: 69
End: 2019-04-17
Payer: MEDICARE

## 2019-04-17 VITALS
SYSTOLIC BLOOD PRESSURE: 127 MMHG | BODY MASS INDEX: 24.5 KG/M2 | DIASTOLIC BLOOD PRESSURE: 74 MMHG | TEMPERATURE: 98.6 F | HEIGHT: 64 IN | HEART RATE: 76 BPM | WEIGHT: 143.5 LBS | RESPIRATION RATE: 18 BRPM

## 2019-04-17 DIAGNOSIS — Z17.0 MALIGNANT NEOPLASM OF UPPER-OUTER QUADRANT OF LEFT BREAST IN FEMALE, ESTROGEN RECEPTOR POSITIVE (HCC): Primary | ICD-10-CM

## 2019-04-17 DIAGNOSIS — C50.412 MALIGNANT NEOPLASM OF UPPER-OUTER QUADRANT OF LEFT BREAST IN FEMALE, ESTROGEN RECEPTOR POSITIVE (HCC): Primary | ICD-10-CM

## 2019-04-17 PROCEDURE — 1036F TOBACCO NON-USER: CPT | Performed by: INTERNAL MEDICINE

## 2019-04-17 PROCEDURE — G8399 PT W/DXA RESULTS DOCUMENT: HCPCS | Performed by: INTERNAL MEDICINE

## 2019-04-17 PROCEDURE — G8427 DOCREV CUR MEDS BY ELIG CLIN: HCPCS | Performed by: INTERNAL MEDICINE

## 2019-04-17 PROCEDURE — G8420 CALC BMI NORM PARAMETERS: HCPCS | Performed by: INTERNAL MEDICINE

## 2019-04-17 PROCEDURE — 3017F COLORECTAL CA SCREEN DOC REV: CPT | Performed by: INTERNAL MEDICINE

## 2019-04-17 PROCEDURE — 99214 OFFICE O/P EST MOD 30 MIN: CPT | Performed by: INTERNAL MEDICINE

## 2019-04-17 PROCEDURE — 1090F PRES/ABSN URINE INCON ASSESS: CPT | Performed by: INTERNAL MEDICINE

## 2019-04-17 PROCEDURE — 1123F ACP DISCUSS/DSCN MKR DOCD: CPT | Performed by: INTERNAL MEDICINE

## 2019-04-17 PROCEDURE — 4040F PNEUMOC VAC/ADMIN/RCVD: CPT | Performed by: INTERNAL MEDICINE

## 2019-04-17 NOTE — PROGRESS NOTES
and accommodation. Extraocular muscles are intact. Neck: Showed no JVD, no carotid bruit . Lungs: Clear to auscultation bilaterally. Heart: Regular without any murmur. Abdomen: Soft, nontender. No hepatosplenomegaly. Extremities: Lower extremities show no edema, clubbing, or cyanosis. Breasts: Left-sided status post lumpectomy and sentinel lymph node biopsy. No adenopathy was appreciated. Right breast is free of any masses or adenopathy. Neuro exam: intact cranial nerves bilaterally no motor or sensory deficit, gait is normal. Lymphatic: no adenopathy appreciated in the supraclavicular, axillary, cervical or inguinal area    Review of radiological studies:   diag mamm 1/17/17  FINDINGS: Correlated with screening mammogram 1/10/2017.  Left breast is heterogeneously dense. Vicie Pastel is a group of heterogeneous appearing calcifications in the left breast confirmed with magnification views. Calcifications are approximately 2:00    anterior depth. There are a few benign scattered and vascular calcifications in the left breast.        Review of laboratory data:   Diagnosis --   LEFT BREAST, RETROAREOLAR, CORE NEEDLE BIOPSIES:            -  WELL-DIFFERENTIATED INVASIVE DUCTAL CARCINOMA, 1.2 MM   GREATEST DIMENSION, ARISING IN A PREDOMINANT BACKGROUND OF HIGH-GRADE   DUCTAL CARCINOMA IN SITU (COMEDO TYPE).          -  ESTROGEN RECEPTOR POSITIVE AND PROGESTERONE RECEPTOR   NEGATIVE IN INVASIVE TUMOR. Left breast lumpectomy March 8, 2017:  Collected: 3/8/2017   Received: 3/8/2017   Reported: 3/10/2017 15:40     -- Diagnosis --   1.  LYMPH NODE, LEFT, SENTINEL NODE BIOPSY:       -  ONE LYMPH NODE, NEGATIVE FOR MALIGNANCY (0/1). 2.  LEFT BREAST, LUMPECTOMY:            -  INTERMEDIATE GRADE DUCTAL CARCINOMA IN SITU WITH   CANCERIZATION OF LOBULES AND ADJACENT BIOPSY SITE REACTION.        -  NO RESIDUAL INVASIVE CARCINOMA PRESENT.       -  MARGINS APPEAR NARROWLY NEGATIVE FOR CARCINOMA IN SITU (SEE   MICROSCOPIC TEMPLATE FOR DISTANCES TO MARGINS).     -  INVASIVE CARCINOMA PREVIOUSLY REPORTED AS POSITIVE FOR   ESTROGEN RECEPTOR AND NEGATIVE FOR PROGESTERONE RECEPTOR.       -  pT1a (STAGING PER PREVIOUS BIOPSY [id number]), pN0(sn)     3.  SOFT TISSUE, LEFT AXILLARY CONTENTS, EXCISION:       -  BENIGN FIBROADIPOSE TISSUE. Sclerae mucinous    IMPRESSION:    Stage TIa invasive ductal carcinoma of the left breast with areas of DCIS, ER positive, CO negative. Status post lumpectomy and radiation. Good performance status and minimal comorbidities    Plan:   The patient is doiny well clinically with no evidence of recurrence. Bilateral mammogram April 15 2019 is negative. Will have left diagnostic mammogram in 6 months. Continue on Arimidex. Tolerated well. Patient was encouraged to use Vitamin D and calcium. Dexa scan reviewed. Dexa scan rosemary be done every 2 years. We will see her again in 6 months for follow-up. Sooner if she notices any problems. Patient's questions were answered to the best of her satisfaction and she verbalized full understanding and agreement.                                  [address] [facility]                          Cell: [phone]

## 2019-10-17 ENCOUNTER — HOSPITAL ENCOUNTER (OUTPATIENT)
Dept: WOMENS IMAGING | Age: 69
Discharge: HOME | End: 2019-10-19
Payer: MEDICARE

## 2019-10-17 DIAGNOSIS — Z17.0 MALIGNANT NEOPLASM OF UPPER-OUTER QUADRANT OF LEFT BREAST IN FEMALE, ESTROGEN RECEPTOR POSITIVE (HCC): ICD-10-CM

## 2019-10-17 DIAGNOSIS — C50.412 MALIGNANT NEOPLASM OF UPPER-OUTER QUADRANT OF LEFT BREAST IN FEMALE, ESTROGEN RECEPTOR POSITIVE (HCC): ICD-10-CM

## 2019-10-17 PROCEDURE — G0279 TOMOSYNTHESIS, MAMMO: HCPCS

## 2019-10-30 ENCOUNTER — OFFICE VISIT (OUTPATIENT)
Dept: ONCOLOGY | Age: 69
End: 2019-10-30
Payer: MEDICARE

## 2019-10-30 VITALS
SYSTOLIC BLOOD PRESSURE: 145 MMHG | HEIGHT: 64 IN | TEMPERATURE: 98.3 F | HEART RATE: 73 BPM | WEIGHT: 147 LBS | BODY MASS INDEX: 25.1 KG/M2 | DIASTOLIC BLOOD PRESSURE: 76 MMHG

## 2019-10-30 DIAGNOSIS — Z12.31 ENCOUNTER FOR SCREENING MAMMOGRAM FOR MALIGNANT NEOPLASM OF BREAST: ICD-10-CM

## 2019-10-30 DIAGNOSIS — C50.412 MALIGNANT NEOPLASM OF UPPER-OUTER QUADRANT OF LEFT BREAST IN FEMALE, ESTROGEN RECEPTOR POSITIVE (HCC): Primary | ICD-10-CM

## 2019-10-30 DIAGNOSIS — Z17.0 MALIGNANT NEOPLASM OF UPPER-OUTER QUADRANT OF LEFT BREAST IN FEMALE, ESTROGEN RECEPTOR POSITIVE (HCC): Primary | ICD-10-CM

## 2019-10-30 PROCEDURE — G8399 PT W/DXA RESULTS DOCUMENT: HCPCS | Performed by: INTERNAL MEDICINE

## 2019-10-30 PROCEDURE — 1036F TOBACCO NON-USER: CPT | Performed by: INTERNAL MEDICINE

## 2019-10-30 PROCEDURE — 4040F PNEUMOC VAC/ADMIN/RCVD: CPT | Performed by: INTERNAL MEDICINE

## 2019-10-30 PROCEDURE — G8427 DOCREV CUR MEDS BY ELIG CLIN: HCPCS | Performed by: INTERNAL MEDICINE

## 2019-10-30 PROCEDURE — 3017F COLORECTAL CA SCREEN DOC REV: CPT | Performed by: INTERNAL MEDICINE

## 2019-10-30 PROCEDURE — 1123F ACP DISCUSS/DSCN MKR DOCD: CPT | Performed by: INTERNAL MEDICINE

## 2019-10-30 PROCEDURE — G8482 FLU IMMUNIZE ORDER/ADMIN: HCPCS | Performed by: INTERNAL MEDICINE

## 2019-10-30 PROCEDURE — 1090F PRES/ABSN URINE INCON ASSESS: CPT | Performed by: INTERNAL MEDICINE

## 2019-10-30 PROCEDURE — G8417 CALC BMI ABV UP PARAM F/U: HCPCS | Performed by: INTERNAL MEDICINE

## 2019-10-30 PROCEDURE — 99214 OFFICE O/P EST MOD 30 MIN: CPT | Performed by: INTERNAL MEDICINE

## 2019-10-30 RX ORDER — ANASTROZOLE 1 MG/1
TABLET ORAL
Qty: 90 TABLET | Refills: 3 | Status: SHIPPED | OUTPATIENT
Start: 2019-10-30 | End: 2020-06-24 | Stop reason: SDUPTHER

## 2019-10-30 RX ORDER — LEVOTHYROXINE SODIUM 0.12 MG/1
125 TABLET ORAL DAILY
COMMUNITY
End: 2022-07-28 | Stop reason: SDUPTHER

## 2020-02-13 PROBLEM — Z12.11 COLON CANCER SCREENING: Status: ACTIVE | Noted: 2020-02-13

## 2020-03-14 PROBLEM — Z12.11 COLON CANCER SCREENING: Status: RESOLVED | Noted: 2020-02-13 | Resolved: 2020-03-14

## 2020-06-16 ENCOUNTER — HOSPITAL ENCOUNTER (OUTPATIENT)
Dept: WOMENS IMAGING | Age: 70
Discharge: HOME | End: 2020-06-18
Payer: MEDICARE

## 2020-06-16 PROCEDURE — 77067 SCR MAMMO BI INCL CAD: CPT

## 2020-06-24 ENCOUNTER — OFFICE VISIT (OUTPATIENT)
Dept: ONCOLOGY | Age: 70
End: 2020-06-24
Payer: MEDICARE

## 2020-06-24 VITALS
HEART RATE: 76 BPM | DIASTOLIC BLOOD PRESSURE: 77 MMHG | SYSTOLIC BLOOD PRESSURE: 140 MMHG | HEIGHT: 64 IN | TEMPERATURE: 97.4 F | RESPIRATION RATE: 18 BRPM | WEIGHT: 137 LBS | BODY MASS INDEX: 23.39 KG/M2

## 2020-06-24 PROCEDURE — 99211 OFF/OP EST MAY X REQ PHY/QHP: CPT | Performed by: INTERNAL MEDICINE

## 2020-06-24 PROCEDURE — G8420 CALC BMI NORM PARAMETERS: HCPCS | Performed by: INTERNAL MEDICINE

## 2020-06-24 PROCEDURE — 1036F TOBACCO NON-USER: CPT | Performed by: INTERNAL MEDICINE

## 2020-06-24 PROCEDURE — 3017F COLORECTAL CA SCREEN DOC REV: CPT | Performed by: INTERNAL MEDICINE

## 2020-06-24 PROCEDURE — 4040F PNEUMOC VAC/ADMIN/RCVD: CPT | Performed by: INTERNAL MEDICINE

## 2020-06-24 PROCEDURE — 99214 OFFICE O/P EST MOD 30 MIN: CPT | Performed by: INTERNAL MEDICINE

## 2020-06-24 PROCEDURE — 1123F ACP DISCUSS/DSCN MKR DOCD: CPT | Performed by: INTERNAL MEDICINE

## 2020-06-24 PROCEDURE — G8427 DOCREV CUR MEDS BY ELIG CLIN: HCPCS | Performed by: INTERNAL MEDICINE

## 2020-06-24 PROCEDURE — 1090F PRES/ABSN URINE INCON ASSESS: CPT | Performed by: INTERNAL MEDICINE

## 2020-06-24 PROCEDURE — G8399 PT W/DXA RESULTS DOCUMENT: HCPCS | Performed by: INTERNAL MEDICINE

## 2020-06-24 RX ORDER — ANASTROZOLE 1 MG/1
TABLET ORAL
Qty: 90 TABLET | Refills: 3 | Status: SHIPPED | OUTPATIENT
Start: 2020-06-24 | End: 2021-06-16 | Stop reason: SDUPTHER

## 2020-06-29 NOTE — PROGRESS NOTES
Tonsillectomy (1961); Colonoscopy (2011); Breast lumpectomy (Left, 03/08/2017); Hysterectomy, vaginal (1993); Cholecystectomy, laparoscopic (1994); and Cardiac catheterization (2005). CURRENT MEDICATIONS:  has a current medication list which includes the following prescription(s): anastrozole, atorvastatin, losartan, levothyroxine, venlafaxine, multiple vitamins-minerals, and aspirin. ALLERGIES:  is allergic to tape [adhesive tape]; daypro [oxaprozin]; pcn [penicillins]; prilosec [omeprazole]; strawberry extract; clindamycin/lincomycin; and vancomycin. FAMILY HISTORY: Negative for any hematological or oncological conditions. SOCIAL HISTORY:  reports that she has never smoked. She has never used smokeless tobacco. She reports that she does not drink alcohol or use drugs. REVIEW OF SYSTEMS:   General: no fever or night sweats, Weight is stable. ENT: No double or blurred vision, no tinnitus or hearing problem, no dysphagia or sore throat   Respiratory: No chest pain, no shortness of breath, no cough or hemoptysis. Cardiovascular: Denies chest pain, PND or orthopnea. No L E swelling or palpitations. Gastrointestinal:    No nausea or vomiting, abdominal pain, diarrhea or constipation. Genitourinary: Denies dysuria, hematuria, frequency, urgency or incontinence. Neurological: Denies headaches, decreased LOC, no sensory or motor focal deficits. Musculoskeletal:  No arthralgia no back pain or joint swelling. Skin: There are no rashes or bleeding. Psychiatric:   she has history of psychiatric illness but lately things have been controlled. Endocrine: no diabetes or thyroid disease. Hematologic: no bleeding , no adenopathy. PHYSICAL EXAM: Shows a well appearing 72y.o.-year-old female who is not in pain or distress. Vital Signs: Blood pressure (!) 140/77, pulse 76, temperature 97.4 °F (36.3 °C), temperature source Temporal, resp.  rate 18, height 5' 4\" (1.626 m), weight 137 lb (62.1 kg), not currently breastfeeding. HEENT: Normocephalic and atraumatic. Pupils are equal, round, reactive to light and accommodation. Extraocular muscles are intact. Neck: Showed no JVD, no carotid bruit . Lungs: Clear to auscultation bilaterally. Heart: Regular without any murmur. Abdomen: Soft, nontender. No hepatosplenomegaly. Extremities: Lower extremities show no edema, clubbing, or cyanosis. Breasts: Left-sided status post lumpectomy and sentinel lymph node biopsy. No adenopathy was appreciated. Right breast is free of any masses or adenopathy. Neuro exam: intact cranial nerves bilaterally no motor or sensory deficit, gait is normal. Lymphatic: no adenopathy appreciated in the supraclavicular, axillary, cervical or inguinal area    Review of radiological studies:   diag mamm 1/17/17  FINDINGS: Correlated with screening mammogram 1/10/2017.  Left breast is heterogeneously dense. Zakiya Chasten is a group of heterogeneous appearing calcifications in the left breast confirmed with magnification views. Calcifications are approximately 2:00    anterior depth. There are a few benign scattered and vascular calcifications in the left breast.        Review of laboratory data:   Diagnosis --   LEFT BREAST, RETROAREOLAR, CORE NEEDLE BIOPSIES:            -  WELL-DIFFERENTIATED INVASIVE DUCTAL CARCINOMA, 1.2 MM   GREATEST DIMENSION, ARISING IN A PREDOMINANT BACKGROUND OF HIGH-GRADE   DUCTAL CARCINOMA IN SITU (COMEDO TYPE).          -  ESTROGEN RECEPTOR POSITIVE AND PROGESTERONE RECEPTOR   NEGATIVE IN INVASIVE TUMOR. Left breast lumpectomy March 8, 2017:  Collected: 3/8/2017   Received: 3/8/2017   Reported: 3/10/2017 15:40     -- Diagnosis --   1.  LYMPH NODE, LEFT, SENTINEL NODE BIOPSY:       -  ONE LYMPH NODE, NEGATIVE FOR MALIGNANCY (0/1). 2.  LEFT BREAST, LUMPECTOMY:            -  INTERMEDIATE GRADE DUCTAL CARCINOMA IN SITU WITH   CANCERIZATION OF LOBULES AND ADJACENT BIOPSY SITE REACTION.        -  NO RESIDUAL INVASIVE

## 2020-12-10 ENCOUNTER — OFFICE VISIT (OUTPATIENT)
Dept: ONCOLOGY | Age: 70
End: 2020-12-10
Payer: MEDICARE

## 2020-12-10 VITALS
WEIGHT: 142 LBS | HEART RATE: 76 BPM | DIASTOLIC BLOOD PRESSURE: 88 MMHG | TEMPERATURE: 97.4 F | HEIGHT: 64 IN | RESPIRATION RATE: 18 BRPM | BODY MASS INDEX: 24.24 KG/M2 | SYSTOLIC BLOOD PRESSURE: 158 MMHG

## 2020-12-10 PROCEDURE — 99214 OFFICE O/P EST MOD 30 MIN: CPT | Performed by: INTERNAL MEDICINE

## 2020-12-10 PROCEDURE — 1123F ACP DISCUSS/DSCN MKR DOCD: CPT | Performed by: INTERNAL MEDICINE

## 2020-12-10 PROCEDURE — 4040F PNEUMOC VAC/ADMIN/RCVD: CPT | Performed by: INTERNAL MEDICINE

## 2020-12-10 PROCEDURE — 1036F TOBACCO NON-USER: CPT | Performed by: INTERNAL MEDICINE

## 2020-12-10 PROCEDURE — G8484 FLU IMMUNIZE NO ADMIN: HCPCS | Performed by: INTERNAL MEDICINE

## 2020-12-10 PROCEDURE — G8428 CUR MEDS NOT DOCUMENT: HCPCS | Performed by: INTERNAL MEDICINE

## 2020-12-10 PROCEDURE — G8420 CALC BMI NORM PARAMETERS: HCPCS | Performed by: INTERNAL MEDICINE

## 2020-12-10 PROCEDURE — 3017F COLORECTAL CA SCREEN DOC REV: CPT | Performed by: INTERNAL MEDICINE

## 2020-12-10 PROCEDURE — 99211 OFF/OP EST MAY X REQ PHY/QHP: CPT | Performed by: INTERNAL MEDICINE

## 2020-12-10 PROCEDURE — G8399 PT W/DXA RESULTS DOCUMENT: HCPCS | Performed by: INTERNAL MEDICINE

## 2020-12-10 PROCEDURE — 1090F PRES/ABSN URINE INCON ASSESS: CPT | Performed by: INTERNAL MEDICINE

## 2020-12-10 NOTE — PROGRESS NOTES
Chief Complaint   Patient presents with    Follow-up     Patient states she feels like a pea sized area by her scar in her left breast     DIAGNOSIS:        Stage TIa invasive ductal carcinoma of the left breast with areas of DCIS, ER positive, NV negative. Status post lumpectomy with close surgical margins. CURRENT THERAPY:         Left breast lumpectomy and sentinel lymph node biopsy on  2017. Radiation therapy completed 5/10/17  Arimidex started 5/15/17    BRIEF CASE HISTORY:      Torres Miguel is a very pleasant 72 y.o. female who is referred to us for management recommendation regarding her breast cancer. She did not have a mammogram since . This year and under the insistence of Dr. Arambula Poster, she went for bilateral screening mammogram.  There was some density in both breasts but there was some calcification that is new in the retroareolar aspect of the left breast.  Diagnostic mammogram was done on  17 And showed scattered calcification at the 2:00 position of the left breast.  Image guided biopsy was done on 17 and showed invasive ductal carcinoma grade 1 measuring 2 mm,  ER positive NV negative and HER-2/fracisco could not be done because of the sample size. She had left breast lumpectomy and sentinel lymph node biopsy on 2017. INTERIM HISTORY:   The patient seen for follow-up breast cancer. Doing well clinically. She felt a lump just under her incision in the left breast.  She is very concerned about this. It is a hard lump fixed to the skin between her incision and the areola. Measures about 7 to 10 mm. Menarche 16, menopause 44 hysterectomy for endometriosis. +0. No HRT. PAST MEDICAL HISTORY: has a past medical history of Chronic kidney disease, stage 3, Depression, Diabetes mellitus ([address] Endometriosis, History of breast cancer, Hyperlipidemia, Hypertension, and Hypothyroidism.     PAST SURGICAL HISTORY: has a past surgical history that includes Appendectomy (1993); Tonsillectomy (1961); Colonoscopy (2011); Breast lumpectomy (Left, 03/08/2017); Hysterectomy, vaginal (1993); Cholecystectomy, laparoscopic (1994); and Cardiac catheterization (2005). CURRENT MEDICATIONS:  has a current medication list which includes the following prescription(s): freestyle lite, anastrozole, atorvastatin, losartan, levothyroxine, venlafaxine, multiple vitamins-minerals, and aspirin. ALLERGIES:  is allergic to tape [adhesive tape]; daypro [oxaprozin]; pcn [penicillins]; prilosec [omeprazole]; strawberry extract; clindamycin/lincomycin; and vancomycin. FAMILY HISTORY: Negative for any hematological or oncological conditions. SOCIAL HISTORY:  reports that she has never smoked. She has never used smokeless tobacco. She reports that she does not drink alcohol or use drugs. REVIEW OF SYSTEMS:   General: no fever or night sweats, Weight is stable. ENT: No double or blurred vision, no tinnitus or hearing problem, no dysphagia or sore throat   Respiratory: No chest pain, no shortness of breath, no cough or hemoptysis. Cardiovascular: Denies chest pain, PND or orthopnea. No L E swelling or palpitations. Gastrointestinal:    No nausea or vomiting, abdominal pain, diarrhea or constipation. Genitourinary: Denies dysuria, hematuria, frequency, urgency or incontinence. Neurological: Denies headaches, decreased LOC, no sensory or motor focal deficits. Musculoskeletal:  No arthralgia no back pain or joint swelling. Skin: There are no rashes or bleeding. Psychiatric:   she has history of psychiatric illness but lately things have been controlled. Endocrine: no diabetes or thyroid disease. Hematologic: no bleeding , no adenopathy. PHYSICAL EXAM: Shows a well appearing 72y.o.-year-old female who is not in pain or distress. Vital Signs: Blood pressure (!) 153/87, pulse 72, temperature 97.4 °F (36.3 °C), temperature source Temporal, resp.  rate 18,

## 2020-12-14 ENCOUNTER — HOSPITAL ENCOUNTER (OUTPATIENT)
Dept: ULTRASOUND IMAGING | Age: 70
Discharge: HOME | End: 2020-12-16
Payer: MEDICARE

## 2020-12-14 ENCOUNTER — HOSPITAL ENCOUNTER (OUTPATIENT)
Dept: WOMENS IMAGING | Age: 70
Discharge: HOME | End: 2020-12-16
Payer: MEDICARE

## 2020-12-14 PROCEDURE — G0279 TOMOSYNTHESIS, MAMMO: HCPCS

## 2020-12-14 PROCEDURE — 76641 ULTRASOUND BREAST COMPLETE: CPT

## 2020-12-16 ENCOUNTER — OFFICE VISIT (OUTPATIENT)
Dept: ONCOLOGY | Age: 70
End: 2020-12-16
Payer: MEDICARE

## 2020-12-16 VITALS
RESPIRATION RATE: 18 BRPM | SYSTOLIC BLOOD PRESSURE: 150 MMHG | BODY MASS INDEX: 24.37 KG/M2 | WEIGHT: 142 LBS | DIASTOLIC BLOOD PRESSURE: 73 MMHG | HEART RATE: 73 BPM | TEMPERATURE: 97 F

## 2020-12-16 PROCEDURE — G8484 FLU IMMUNIZE NO ADMIN: HCPCS | Performed by: INTERNAL MEDICINE

## 2020-12-16 PROCEDURE — 3017F COLORECTAL CA SCREEN DOC REV: CPT | Performed by: INTERNAL MEDICINE

## 2020-12-16 PROCEDURE — 1036F TOBACCO NON-USER: CPT | Performed by: INTERNAL MEDICINE

## 2020-12-16 PROCEDURE — 1090F PRES/ABSN URINE INCON ASSESS: CPT | Performed by: INTERNAL MEDICINE

## 2020-12-16 PROCEDURE — 99211 OFF/OP EST MAY X REQ PHY/QHP: CPT

## 2020-12-16 PROCEDURE — 99213 OFFICE O/P EST LOW 20 MIN: CPT | Performed by: INTERNAL MEDICINE

## 2020-12-16 PROCEDURE — G8399 PT W/DXA RESULTS DOCUMENT: HCPCS | Performed by: INTERNAL MEDICINE

## 2020-12-16 PROCEDURE — 4040F PNEUMOC VAC/ADMIN/RCVD: CPT | Performed by: INTERNAL MEDICINE

## 2020-12-16 PROCEDURE — G8420 CALC BMI NORM PARAMETERS: HCPCS | Performed by: INTERNAL MEDICINE

## 2020-12-16 PROCEDURE — G8427 DOCREV CUR MEDS BY ELIG CLIN: HCPCS | Performed by: INTERNAL MEDICINE

## 2020-12-16 PROCEDURE — 1123F ACP DISCUSS/DSCN MKR DOCD: CPT | Performed by: INTERNAL MEDICINE

## 2020-12-16 NOTE — PROGRESS NOTES
Chief Complaint   Patient presents with    Results     Mammogram on 20 due to left breast lump     DIAGNOSIS:        Stage TIa invasive ductal carcinoma of the left breast with areas of DCIS, ER positive, ME negative. Status post lumpectomy with close surgical margins. CURRENT THERAPY:         Left breast lumpectomy and sentinel lymph node biopsy on  2017. Radiation therapy completed 5/10/17  Arimidex started 5/15/17    BRIEF CASE HISTORY:      Cleveland Castle is a very pleasant 72 y.o. female who is referred to us for management recommendation regarding her breast cancer. She did not have a mammogram since . This year and under the insistence of Dr. Joann Layton, she went for bilateral screening mammogram.  There was some density in both breasts but there was some calcification that is new in the retroareolar aspect of the left breast.  Diagnostic mammogram was done on  17 And showed scattered calcification at the 2:00 position of the left breast.  Image guided biopsy was done on 17 and showed invasive ductal carcinoma grade 1 measuring 2 mm,  ER positive ME negative and HER-2/fracisco could not be done because of the sample size. She had left breast lumpectomy and sentinel lymph node biopsy on 2017. INTERIM HISTORY:   The patient was seen 2 weeks ago, during my exam, I felt a mass/scar just below her incision. I was concerned about this so a diagnostic mammogram and ultrasound were done. Those were reviewed and showed no evidence of concerning mass or lesion. It was felt that she had scar from her previous surgery. The patient was reassured. The patient comes in today. I reviewed the imaging with her. She is very happy about the results. Menarche 16, menopause 44 hysterectomy for endometriosis. +0. No HRT.      PAST MEDICAL HISTORY: has a past medical history of Chronic kidney disease, stage 3, Depression, Diabetes mellitus ([address] Endometriosis, History of breast cancer, Hyperlipidemia, Hypertension, and Hypothyroidism. PAST SURGICAL HISTORY: has a past surgical history that includes Appendectomy (1993); Tonsillectomy (1961); Colonoscopy (2011); Breast lumpectomy (Left, 03/08/2017); Hysterectomy, vaginal (1993); Cholecystectomy, laparoscopic (1994); and Cardiac catheterization (2005). CURRENT MEDICATIONS:  has a current medication list which includes the following prescription(s): freestyle lite, anastrozole, atorvastatin, losartan, levothyroxine, venlafaxine, multiple vitamins-minerals, and aspirin. ALLERGIES:  is allergic to tape [adhesive tape]; daypro [oxaprozin]; pcn [penicillins]; prilosec [omeprazole]; strawberry extract; clindamycin/lincomycin; and vancomycin. FAMILY HISTORY: Negative for any hematological or oncological conditions. SOCIAL HISTORY:  reports that she has never smoked. She has never used smokeless tobacco. She reports that she does not drink alcohol or use drugs. REVIEW OF SYSTEMS:   General: no fever or night sweats, Weight is stable. ENT: No double or blurred vision, no tinnitus or hearing problem, no dysphagia or sore throat   Respiratory: No chest pain, no shortness of breath, no cough or hemoptysis. Cardiovascular: Denies chest pain, PND or orthopnea. No L E swelling or palpitations. Gastrointestinal:    No nausea or vomiting, abdominal pain, diarrhea or constipation. Genitourinary: Denies dysuria, hematuria, frequency, urgency or incontinence. Neurological: Denies headaches, decreased LOC, no sensory or motor focal deficits. Musculoskeletal:  No arthralgia no back pain or joint swelling. Skin: There are no rashes or bleeding. Psychiatric:   she has history of psychiatric illness but lately things have been controlled. Endocrine: no diabetes or thyroid disease. Hematologic: no bleeding , no adenopathy. PHYSICAL EXAM: Shows a well appearing 72y.o.-year-old female who is not in pain or distress. Vital Signs: Blood pressure (!) 150/73, pulse 73, temperature 97 °F (36.1 °C), temperature source Temporal, resp. rate 18, weight 142 lb (64.4 kg), not currently breastfeeding. HEENT: Normocephalic and atraumatic. Pupils are equal, round, reactive to light and accommodation. Extraocular muscles are intact. Neck: Showed no JVD, no carotid bruit . Lungs: Clear to auscultation bilaterally. Heart: Regular without any murmur. Abdomen: Soft, nontender. No hepatosplenomegaly. Extremities: Lower extremities show no edema, clubbing, or cyanosis. Breasts: Left-sided status post lumpectomy and sentinel lymph node biopsy. No adenopathy was appreciated. As discussed above, there is a small 7 to 10 mm hard area inferior to her incision between the incision and areola, attached to skin. Right breast is free of any masses or adenopathy. Neuro exam: intact cranial nerves bilaterally no motor or sensory deficit, gait is normal. Lymphatic: no adenopathy appreciated in the supraclavicular, axillary, cervical or inguinal area    Review of radiological studies:   Mamm 6/2020  Impression    Stable post treatment changes in the left breast.  No evidence of malignancy. Advise annual screening mammography.         BI-RADS 2         BIRADS:    BIRADS - CATEGORY 2      Diagnostic mamm 12/2020  Impression   No evidence for malignancy.       BIRADS:   BIRADS - CATEGORY 2       Benign, no evidence of malignancy.  Patient will be due for screening   mammogram in 6 months.       OVERALL ASSESSMENT - BENIGN        IMPRESSION:    Stage TIa invasive ductal carcinoma of the left breast with areas of DCIS, ER positive, GA negative. Status post lumpectomy and radiation. Good performance status and minimal comorbidities  Adjuvant Arimidex    Plan:   The patient was reassured, I reviewed the imaging study with her. It looks like what we felt was related to the scar.   She was reassured, we will continue surveillance and observation we will continue her on Arimidex.

## 2021-06-16 ENCOUNTER — OFFICE VISIT (OUTPATIENT)
Dept: ONCOLOGY | Age: 71
End: 2021-06-16
Payer: MEDICARE

## 2021-06-16 VITALS
DIASTOLIC BLOOD PRESSURE: 76 MMHG | SYSTOLIC BLOOD PRESSURE: 140 MMHG | HEIGHT: 64 IN | RESPIRATION RATE: 18 BRPM | HEART RATE: 83 BPM | BODY MASS INDEX: 25.27 KG/M2 | WEIGHT: 148 LBS | TEMPERATURE: 98 F

## 2021-06-16 DIAGNOSIS — Z12.31 ENCOUNTER FOR SCREENING MAMMOGRAM FOR MALIGNANT NEOPLASM OF BREAST: ICD-10-CM

## 2021-06-16 DIAGNOSIS — Z17.0 MALIGNANT NEOPLASM OF UPPER-OUTER QUADRANT OF LEFT BREAST IN FEMALE, ESTROGEN RECEPTOR POSITIVE (HCC): ICD-10-CM

## 2021-06-16 DIAGNOSIS — C50.412 MALIGNANT NEOPLASM OF UPPER-OUTER QUADRANT OF LEFT BREAST IN FEMALE, ESTROGEN RECEPTOR POSITIVE (HCC): ICD-10-CM

## 2021-06-16 PROCEDURE — 99214 OFFICE O/P EST MOD 30 MIN: CPT | Performed by: INTERNAL MEDICINE

## 2021-06-16 PROCEDURE — 1036F TOBACCO NON-USER: CPT | Performed by: INTERNAL MEDICINE

## 2021-06-16 PROCEDURE — G8427 DOCREV CUR MEDS BY ELIG CLIN: HCPCS | Performed by: INTERNAL MEDICINE

## 2021-06-16 PROCEDURE — 1123F ACP DISCUSS/DSCN MKR DOCD: CPT | Performed by: INTERNAL MEDICINE

## 2021-06-16 PROCEDURE — G8417 CALC BMI ABV UP PARAM F/U: HCPCS | Performed by: INTERNAL MEDICINE

## 2021-06-16 PROCEDURE — G8399 PT W/DXA RESULTS DOCUMENT: HCPCS | Performed by: INTERNAL MEDICINE

## 2021-06-16 PROCEDURE — 4040F PNEUMOC VAC/ADMIN/RCVD: CPT | Performed by: INTERNAL MEDICINE

## 2021-06-16 PROCEDURE — 99211 OFF/OP EST MAY X REQ PHY/QHP: CPT | Performed by: INTERNAL MEDICINE

## 2021-06-16 PROCEDURE — 1090F PRES/ABSN URINE INCON ASSESS: CPT | Performed by: INTERNAL MEDICINE

## 2021-06-16 PROCEDURE — 3017F COLORECTAL CA SCREEN DOC REV: CPT | Performed by: INTERNAL MEDICINE

## 2021-06-16 RX ORDER — ANASTROZOLE 1 MG/1
TABLET ORAL
Qty: 90 TABLET | Refills: 3 | Status: SHIPPED | OUTPATIENT
Start: 2021-06-16 | End: 2021-08-16

## 2021-06-16 NOTE — PROGRESS NOTES
Chief Complaint   Patient presents with    Follow-up     breast cancer     DIAGNOSIS:        Stage TIa invasive ductal carcinoma of the left breast with areas of DCIS, ER positive, CO negative. Status post lumpectomy with close surgical margins. CURRENT THERAPY:         Left breast lumpectomy and sentinel lymph node biopsy on  2017. Radiation therapy completed 5/10/17  Arimidex started 5/15/17    BRIEF CASE HISTORY:      Marjorie Cheung is a very pleasant 80 y.o. female who is referred to us for management recommendation regarding her breast cancer. She did not have a mammogram since . This year and under the insistence of Dr. Jemma Hauser, she went for bilateral screening mammogram.  There was some density in both breasts but there was some calcification that is new in the retroareolar aspect of the left breast.  Diagnostic mammogram was done on  17 And showed scattered calcification at the 2:00 position of the left breast.  Image guided biopsy was done on 17 and showed invasive ductal carcinoma grade 1 measuring 2 mm,  ER positive CO negative and HER-2/fracisco could not be done because of the sample size. She had left breast lumpectomy and sentinel lymph node biopsy on 2017. INTERIM HISTORY:   The patient comes in today for a follow-up  She continues to be doing well. She is tolerating Arimidex well with minimal hot flashes. No nausea or vomiting no fever or chills or other side effects. No bone pain, no nausea or vomiting. Menarche 16, menopause 44 hysterectomy for endometriosis. +0. No HRT. PAST MEDICAL HISTORY: has a past medical history of Chronic kidney disease, stage 3 ([address] Depression, Diabetes mellitus ([address] Endometriosis, History of breast cancer, Hyperlipidemia, Hypertension, and Hypothyroidism. PAST SURGICAL HISTORY: has a past surgical history that includes Appendectomy (); Tonsillectomy (); Colonoscopy ();  Breast lumpectomy (Left, 03/08/2017); Hysterectomy, vaginal (1993); Cholecystectomy, laparoscopic (1994); and Cardiac catheterization (2005). CURRENT MEDICATIONS:  has a current medication list which includes the following prescription(s): atorvastatin, losartan, freestyle lite, anastrozole, levothyroxine, venlafaxine, multiple vitamin, and aspirin. ALLERGIES:  is allergic to tape [adhesive tape], daypro [oxaprozin], pcn [penicillins], prilosec [omeprazole], strawberry extract, clindamycin/lincomycin, and vancomycin. FAMILY HISTORY: Negative for any hematological or oncological conditions. SOCIAL HISTORY:  reports that she has never smoked. She has never used smokeless tobacco. She reports that she does not drink alcohol and does not use drugs. REVIEW OF SYSTEMS:   General: no fever or night sweats, Weight is stable. ENT: No double or blurred vision, no tinnitus or hearing problem, no dysphagia or sore throat   Respiratory: No chest pain, no shortness of breath, no cough or hemoptysis. Cardiovascular: Denies chest pain, PND or orthopnea. No L E swelling or palpitations. Gastrointestinal:    No nausea or vomiting, abdominal pain, diarrhea or constipation. Genitourinary: Denies dysuria, hematuria, frequency, urgency or incontinence. Neurological: Denies headaches, decreased LOC, no sensory or motor focal deficits. Musculoskeletal:  No arthralgia no back pain or joint swelling. Skin: There are no rashes or bleeding. Psychiatric:   she has history of psychiatric illness but lately things have been controlled. Endocrine: no diabetes or thyroid disease. Hematologic: no bleeding , no adenopathy. PHYSICAL EXAM: Shows a well appearing 80y.o.-year-old female who is not in pain or distress. Vital Signs: Blood pressure (!) 140/76, pulse 83, temperature 98 °F (36.7 °C), temperature source Temporal, resp. rate 18, height 5' 4\" (1.626 m), weight 148 lb (67.1 kg), not currently breastfeeding.  HEENT: Normocephalic and atraumatic. Pupils are equal, round, reactive to light and accommodation. Extraocular muscles are intact. Neck: Showed no JVD, no carotid bruit . Lungs: Clear to auscultation bilaterally. Heart: Regular without any murmur. Abdomen: Soft, nontender. No hepatosplenomegaly. Extremities: Lower extremities show no edema, clubbing, or cyanosis. Breasts: Left-sided status post lumpectomy and sentinel lymph node biopsy. No adenopathy was appreciated. As discussed above, there is a small 7 to 10 mm hard area inferior to her incision between the incision and areola, attached to skin. Right breast is free of any masses or adenopathy. Neuro exam: intact cranial nerves bilaterally no motor or sensory deficit, gait is normal. Lymphatic: no adenopathy appreciated in the supraclavicular, axillary, cervical or inguinal area    Review of radiological studies:   Mamm 6/2020  Impression    Stable post treatment changes in the left breast.  No evidence of malignancy. Advise annual screening mammography.         BI-RADS 2         BIRADS:    BIRADS - CATEGORY 2      Diagnostic mamm 12/2020  Impression   No evidence for malignancy.       BIRADS:   BIRADS - CATEGORY 2       Benign, no evidence of malignancy.  Patient will be due for screening   mammogram in 6 months.       OVERALL ASSESSMENT - BENIGN        IMPRESSION:    Stage TIa invasive ductal carcinoma of the left breast with areas of DCIS, ER positive, [place] negative. Status post lumpectomy and radiation.   Good performance status and minimal comorbidities  Adjuvant Arimidex    Plan:   The patient continues to do very well  No evidence of recurrent disease, mammogram was reviewed, no evidence of recurrent  Examination today showed stable examination with a scar in the left breast.  No evidence of metastatic disease or recurrent disease  We will continue Arimidex to finish 5 years of adjuvant therapy  We will see her back in 6 months for follow-up     Bone density reviewed from 2019, normal bone density

## 2021-12-15 ENCOUNTER — HOSPITAL ENCOUNTER (OUTPATIENT)
Dept: WOMENS IMAGING | Age: 71
Discharge: HOME | End: 2021-12-17
Payer: MEDICARE

## 2021-12-15 DIAGNOSIS — C50.412 MALIGNANT NEOPLASM OF UPPER-OUTER QUADRANT OF LEFT BREAST IN FEMALE, ESTROGEN RECEPTOR POSITIVE (HCC): ICD-10-CM

## 2021-12-15 DIAGNOSIS — Z17.0 MALIGNANT NEOPLASM OF UPPER-OUTER QUADRANT OF LEFT BREAST IN FEMALE, ESTROGEN RECEPTOR POSITIVE (HCC): ICD-10-CM

## 2021-12-15 DIAGNOSIS — Z12.31 ENCOUNTER FOR SCREENING MAMMOGRAM FOR MALIGNANT NEOPLASM OF BREAST: ICD-10-CM

## 2021-12-15 PROCEDURE — 77063 BREAST TOMOSYNTHESIS BI: CPT

## 2022-03-10 ENCOUNTER — OFFICE VISIT (OUTPATIENT)
Dept: ONCOLOGY | Age: 72
End: 2022-03-10
Payer: MEDICARE

## 2022-03-10 VITALS
TEMPERATURE: 98.2 F | WEIGHT: 132.4 LBS | SYSTOLIC BLOOD PRESSURE: 149 MMHG | RESPIRATION RATE: 18 BRPM | DIASTOLIC BLOOD PRESSURE: 73 MMHG | BODY MASS INDEX: 22.73 KG/M2 | HEART RATE: 74 BPM

## 2022-03-10 DIAGNOSIS — Z17.0 MALIGNANT NEOPLASM OF UPPER-OUTER QUADRANT OF LEFT BREAST IN FEMALE, ESTROGEN RECEPTOR POSITIVE (HCC): Primary | ICD-10-CM

## 2022-03-10 DIAGNOSIS — C50.412 MALIGNANT NEOPLASM OF UPPER-OUTER QUADRANT OF LEFT BREAST IN FEMALE, ESTROGEN RECEPTOR POSITIVE (HCC): Primary | ICD-10-CM

## 2022-03-10 PROCEDURE — G8420 CALC BMI NORM PARAMETERS: HCPCS | Performed by: INTERNAL MEDICINE

## 2022-03-10 PROCEDURE — G8427 DOCREV CUR MEDS BY ELIG CLIN: HCPCS | Performed by: INTERNAL MEDICINE

## 2022-03-10 PROCEDURE — 1090F PRES/ABSN URINE INCON ASSESS: CPT | Performed by: INTERNAL MEDICINE

## 2022-03-10 PROCEDURE — 4040F PNEUMOC VAC/ADMIN/RCVD: CPT | Performed by: INTERNAL MEDICINE

## 2022-03-10 PROCEDURE — 1123F ACP DISCUSS/DSCN MKR DOCD: CPT | Performed by: INTERNAL MEDICINE

## 2022-03-10 PROCEDURE — 3017F COLORECTAL CA SCREEN DOC REV: CPT | Performed by: INTERNAL MEDICINE

## 2022-03-10 PROCEDURE — G8482 FLU IMMUNIZE ORDER/ADMIN: HCPCS | Performed by: INTERNAL MEDICINE

## 2022-03-10 PROCEDURE — G8399 PT W/DXA RESULTS DOCUMENT: HCPCS | Performed by: INTERNAL MEDICINE

## 2022-03-10 PROCEDURE — 1036F TOBACCO NON-USER: CPT | Performed by: INTERNAL MEDICINE

## 2022-03-10 PROCEDURE — 99214 OFFICE O/P EST MOD 30 MIN: CPT | Performed by: INTERNAL MEDICINE

## 2022-03-10 RX ORDER — ANASTROZOLE 1 MG/1
TABLET ORAL
Qty: 30 TABLET | Refills: 0 | Status: SHIPPED | OUTPATIENT
Start: 2022-03-10 | End: 2022-04-18

## 2022-03-10 NOTE — PROGRESS NOTES
Chief Complaint   Patient presents with    Follow-up     breast cancer     DIAGNOSIS:        Stage TIa invasive ductal carcinoma of the left breast with areas of DCIS, ER positive, MA negative. Status post lumpectomy with close surgical margins. CURRENT THERAPY:         Left breast lumpectomy and sentinel lymph node biopsy on  March 8, 2017. Radiation therapy completed 5/10/17  Arimidex started 5/15/17    BRIEF CASE HISTORY:      Shana Flores is a very pleasant 80 y.o. female who is referred to us for management recommendation regarding her breast cancer. She did not have a mammogram since 2011. This year and under the insistence of Dr. Amilcar Dowling, she went for bilateral screening mammogram.  There was some density in both breasts but there was some calcification that is new in the retroareolar aspect of the left breast.  Diagnostic mammogram was done on  1/17/17 And showed scattered calcification at the 2:00 position of the left breast.  Image guided biopsy was done on 2/1/17 and showed invasive ductal carcinoma grade 1 measuring 2 mm,  ER positive MA negative and HER-2/fracisco could not be done because of the sample size. She had left breast lumpectomy and sentinel lymph node biopsy on March 8, 2017. INTERIM HISTORY:   The patient comes in today for a follow-up  She continues to be doing well. She is tolerating Arimidex well with minimal hot flashes. No nausea or vomiting no fever or chills or other side effects. No bone pain, no nausea or vomiting. PAST MEDICAL HISTORY: has a past medical history of Chronic kidney disease, stage 3 ([address] Depression, Diabetes mellitus ([address] Endometriosis, History of breast cancer, Hyperlipidemia, Hypertension, and Hypothyroidism. PAST SURGICAL HISTORY: has a past surgical history that includes Appendectomy (1993); Tonsillectomy (1961); Colonoscopy (2011); Breast lumpectomy (Left, 03/08/2017); Hysterectomy, vaginal (1993);  Cholecystectomy, laparoscopic (1994); and Cardiac catheterization (2005). CURRENT MEDICATIONS:  has a current medication list which includes the following prescription(s): anastrozole, atorvastatin, losartan, freestyle lite, levothyroxine, venlafaxine, multiple vitamin, and aspirin. ALLERGIES:  is allergic to tape [adhesive tape], daypro [oxaprozin], pcn [penicillins], prilosec [omeprazole], strawberry extract, clindamycin/lincomycin, and vancomycin. FAMILY HISTORY: Negative for any hematological or oncological conditions. SOCIAL HISTORY:  reports that she has never smoked. She has never used smokeless tobacco. She reports that she does not drink alcohol and does not use drugs. REVIEW OF SYSTEMS:   General: no fever or night sweats, Weight is stable. ENT: No double or blurred vision, no tinnitus or hearing problem, no dysphagia or sore throat   Respiratory: No chest pain, no shortness of breath, no cough or hemoptysis. Cardiovascular: Denies chest pain, PND or orthopnea. No L E swelling or palpitations. Gastrointestinal:    No nausea or vomiting, abdominal pain, diarrhea or constipation. Genitourinary: Denies dysuria, hematuria, frequency, urgency or incontinence. Neurological: Denies headaches, decreased LOC, no sensory or motor focal deficits. Musculoskeletal:  No arthralgia no back pain or joint swelling. Skin: There are no rashes or bleeding. Psychiatric:   she has history of psychiatric illness but lately things have been controlled. Endocrine: no diabetes or thyroid disease. Hematologic: no bleeding , no adenopathy. PHYSICAL EXAM: Shows a well appearing 80y.o.-year-old female who is not in pain or distress. Vital Signs: Blood pressure (!) 149/73, pulse 74, temperature 98.2 °F (36.8 °C), temperature source Temporal, resp. rate 18, weight 132 lb 6.4 oz (60.1 kg), not currently breastfeeding. HEENT: Normocephalic and atraumatic. Pupils are equal, round, reactive to light and accommodation. Extraocular muscles are intact. Neck: Showed no JVD, no carotid bruit . Lungs: Clear to auscultation bilaterally. Heart: Regular without any murmur. Abdomen: Soft, nontender. No hepatosplenomegaly. Extremities: Lower extremities show no edema, clubbing, or cyanosis. Breasts: Left-sided status post lumpectomy and sentinel lymph node biopsy. No adenopathy was appreciated. As discussed above, there is a small 7 to 10 mm hard area inferior to her incision between the incision and areola, attached to skin. Right breast is free of any masses or adenopathy. Neuro exam: intact cranial nerves bilaterally no motor or sensory deficit, gait is normal. Lymphatic: no adenopathy appreciated in the supraclavicular, axillary, cervical or inguinal area    Review of radiological studies:   Mamm 6/2020  Impression    Stable post treatment changes in the left breast.  No evidence of malignancy. Advise annual screening mammography.         BI-RADS 2         BIRADS:    BIRADS - CATEGORY 2      Diagnostic mamm 12/2020  Impression   No evidence for malignancy.       BIRADS:   BIRADS - CATEGORY 2       Benign, no evidence of malignancy.  Patient will be due for screening   mammogram in 6 months.       OVERALL ASSESSMENT - BENIGN        IMPRESSION:   Stage TIa invasive ductal carcinoma of the left breast with areas of DCIS, ER positive, [place] negative. Status post lumpectomy and radiation.   Good performance status and minimal comorbidities  Adjuvant Arimidex which was started back in May 2017  Woman bone density scan back in 2019    Plan:   The patient continues to do very well  No evidence of recurrent disease, mammogram was reviewed, no evidence of recurrent  Examination today showed stable examination with a scar in the left breast.    No evidence of metastatic disease or recurrent disease  We will continue Arimidex to finish 5 years of adjuvant therapy    Repeat bone density scan     We will see her back in 6 months for follow-up [facility]                            This note is created with the assistance of a speech recognition program.  While intending to generate a document that actually reflects the content of the visit, the document can still have some errors including those of syntax and sound a like substitutions which may escape proof reading. It such instances, actual meaning can be extrapolated by contextual diversion.

## 2022-03-24 ENCOUNTER — HOSPITAL ENCOUNTER (OUTPATIENT)
Dept: NUCLEAR MEDICINE | Age: 72
Discharge: HOME | End: 2022-03-26
Payer: MEDICARE

## 2022-03-24 DIAGNOSIS — C50.412 MALIGNANT NEOPLASM OF UPPER-OUTER QUADRANT OF LEFT BREAST IN FEMALE, ESTROGEN RECEPTOR POSITIVE (HCC): ICD-10-CM

## 2022-03-24 DIAGNOSIS — Z17.0 MALIGNANT NEOPLASM OF UPPER-OUTER QUADRANT OF LEFT BREAST IN FEMALE, ESTROGEN RECEPTOR POSITIVE (HCC): ICD-10-CM

## 2022-03-24 PROCEDURE — 3430000000 HC RX DIAGNOSTIC RADIOPHARMACEUTICAL: Performed by: INTERNAL MEDICINE

## 2022-03-24 PROCEDURE — A9503 TC99M MEDRONATE: HCPCS | Performed by: INTERNAL MEDICINE

## 2022-03-24 PROCEDURE — 78306 BONE IMAGING WHOLE BODY: CPT

## 2022-03-24 RX ORDER — TC 99M MEDRONATE 20 MG/10ML
25 INJECTION, POWDER, LYOPHILIZED, FOR SOLUTION INTRAVENOUS
Status: COMPLETED | OUTPATIENT
Start: 2022-03-24 | End: 2022-03-24

## 2022-03-24 RX ADMIN — TC 99M MEDRONATE 25 MILLICURIE: 20 INJECTION, POWDER, LYOPHILIZED, FOR SOLUTION INTRAVENOUS at 10:19

## 2022-04-18 RX ORDER — ANASTROZOLE 1 MG/1
TABLET ORAL
Qty: 30 TABLET | Refills: 0 | Status: SHIPPED | OUTPATIENT
Start: 2022-04-18 | End: 2022-05-20

## 2022-05-20 RX ORDER — ANASTROZOLE 1 MG/1
TABLET ORAL
Qty: 30 TABLET | Refills: 1 | Status: SHIPPED | OUTPATIENT
Start: 2022-05-20 | End: 2022-07-18

## 2022-06-21 ENCOUNTER — HOSPITAL ENCOUNTER (EMERGENCY)
Age: 72
Discharge: HOME | End: 2022-06-21
Attending: EMERGENCY MEDICINE
Payer: MEDICARE

## 2022-06-21 ENCOUNTER — APPOINTMENT (OUTPATIENT)
Dept: CT IMAGING | Age: 72
End: 2022-06-21
Payer: MEDICARE

## 2022-06-21 VITALS
WEIGHT: 132 LBS | DIASTOLIC BLOOD PRESSURE: 101 MMHG | TEMPERATURE: 97.9 F | SYSTOLIC BLOOD PRESSURE: 187 MMHG | OXYGEN SATURATION: 97 % | HEART RATE: 82 BPM | RESPIRATION RATE: 15 BRPM | BODY MASS INDEX: 22.66 KG/M2

## 2022-06-21 DIAGNOSIS — M54.32 SCIATICA OF LEFT SIDE: Primary | ICD-10-CM

## 2022-06-21 DIAGNOSIS — R03.0 ELEVATED BLOOD PRESSURE READING: ICD-10-CM

## 2022-06-21 LAB — GLUCOSE BLD-MCNC: 167 MG/DL (ref 74–100)

## 2022-06-21 PROCEDURE — 72131 CT LUMBAR SPINE W/O DYE: CPT

## 2022-06-21 PROCEDURE — 96374 THER/PROPH/DIAG INJ IV PUSH: CPT

## 2022-06-21 PROCEDURE — 6370000000 HC RX 637 (ALT 250 FOR IP): Performed by: EMERGENCY MEDICINE

## 2022-06-21 PROCEDURE — 96375 TX/PRO/DX INJ NEW DRUG ADDON: CPT

## 2022-06-21 PROCEDURE — 6360000002 HC RX W HCPCS: Performed by: EMERGENCY MEDICINE

## 2022-06-21 PROCEDURE — 82947 ASSAY GLUCOSE BLOOD QUANT: CPT

## 2022-06-21 PROCEDURE — 99284 EMERGENCY DEPT VISIT MOD MDM: CPT

## 2022-06-21 RX ORDER — HYDROCODONE BITARTRATE AND ACETAMINOPHEN 5; 325 MG/1; MG/1
1 TABLET ORAL ONCE
Status: COMPLETED | OUTPATIENT
Start: 2022-06-21 | End: 2022-06-21

## 2022-06-21 RX ORDER — ONDANSETRON 2 MG/ML
4 INJECTION INTRAMUSCULAR; INTRAVENOUS ONCE
Status: COMPLETED | OUTPATIENT
Start: 2022-06-21 | End: 2022-06-21

## 2022-06-21 RX ORDER — FENTANYL CITRATE 50 UG/ML
25 INJECTION, SOLUTION INTRAMUSCULAR; INTRAVENOUS ONCE
Status: COMPLETED | OUTPATIENT
Start: 2022-06-21 | End: 2022-06-21

## 2022-06-21 RX ADMIN — Medication 4 TABLET: at 06:01

## 2022-06-21 RX ADMIN — HYDROCODONE BITARTRATE AND ACETAMINOPHEN 1 TABLET: 5; 325 TABLET ORAL at 05:34

## 2022-06-21 RX ADMIN — FENTANYL CITRATE 25 MCG: 50 INJECTION, SOLUTION INTRAMUSCULAR; INTRAVENOUS at 04:55

## 2022-06-21 RX ADMIN — ONDANSETRON 4 MG: 2 INJECTION INTRAMUSCULAR; INTRAVENOUS at 04:54

## 2022-06-21 ASSESSMENT — PAIN SCALES - GENERAL
PAINLEVEL_OUTOF10: 10
PAINLEVEL_OUTOF10: 10

## 2022-06-21 ASSESSMENT — PAIN DESCRIPTION - LOCATION: LOCATION: BUTTOCKS

## 2022-06-21 ASSESSMENT — PAIN DESCRIPTION - ORIENTATION: ORIENTATION: LEFT

## 2022-06-21 NOTE — ED PROVIDER NOTES
[facility] ED  EMERGENCY DEPARTMENT ENCOUNTER      Pt Name: Kary Mcnair  MRN: [id number]  [place] 1950  Date of evaluation: 6/21/2022  Provider: Avelino Kilgore MD    CHIEF COMPLAINT       Chief Complaint   Patient presents with    Hip Pain     left hip pain radiating down left leg    Nausea         HISTORY OF PRESENT ILLNESS      Kary Mcnair is a 71 y.o. female who presents to the emergency department for evaluation of left-sided \"sciatica\" pain. She reports that she has been dealing with this for the last few months, though it has been getting progressively worse. It worsened to a point last night where she was unable to get any sleep and was starting to feel nauseated secondary to the pain. She notes that the pain is present predominantly within the left buttock, radiating down the left leg but is not associated with any changes in strength or sensation or any changes in bowel or bladder function. Denies any other back pain. No right-sided symptoms. No chest pain or shortness of breath. No vomiting, though does have some intermittent nausea. No abdominal pain. No urinary complaints. Was seen by her PCP last week and advised to try some stretching exercises and plans to see a chiropractor later today. REVIEW OF SYSTEMS       As above in HPI.       PAST MEDICAL HISTORY     Past Medical History:   Diagnosis Date    Chronic kidney disease, stage 3 ([address]     Depression     Diabetes mellitus ([address]     Endometriosis     History of breast cancer 02/01/2017    well differentiated, invasive ductal carcinoma     Hyperlipidemia     Hypertension     Hypothyroidism          SURGICAL HISTORY       Past Surgical History:   Procedure Laterality Date    APPENDECTOMY  1993    BREAST LUMPECTOMY Left 03/08/2017    Dr. Jose Osman - sentinel node with lumpectomy   Verlin Rater  2005    Dr. Eunice Sylvester - normal coronaries   Keesha Wallace [address]  2011    Dr. Soledad Galeazzi - no polyps    HYSTERECTOMY, VAGINAL  1993    with unilateral oophorectomy    TONSILLECTOMY  1961         CURRENT MEDICATIONS       Previous Medications    ANASTROZOLE (ARIMIDEX) 1 MG TABLET    Take 1 tablet by mouth once daily    ASPIRIN 81 MG TABLET    Take 81 mg by mouth daily. ATORVASTATIN (LIPITOR) 20 MG TABLET    Take 1 tablet by mouth daily    FREESTYLE LITE STRIP    USE 1 STRIP TO CHECK GLUCOSE ONCE DAILY AS DIRECTED    LEVOTHYROXINE (SYNTHROID) 125 MCG TABLET    Take 125 mcg by mouth Daily    LOSARTAN (COZAAR) 50 MG TABLET    Take 1 tablet by mouth once daily    MULTIPLE VITAMINS-MINERALS (MULTIVITAMIN ADULT PO)    Take by mouth daily    VENLAFAXINE (EFFEXOR) 50 MG TABLET    Take 25 mg by mouth 3 times daily       ALLERGIES       Tape [adhesive tape], Daypro [oxaprozin], Pcn [penicillins], Prilosec [omeprazole], Strawberry extract, Clindamycin/lincomycin, and Vancomycin    FAMILY HISTORY       Family History   Problem Relation Age of Onset    Cancer Mother         bone    Heart Attack Father     Heart Disease Father     Asthma Brother     No Known Problems Sister     No Known Problems Sister           SOCIAL HISTORY       Social History     Tobacco Use    Smoking status: Never Smoker    Smokeless tobacco: Never Used   Substance Use Topics    Alcohol use: No    Drug use: No         PHYSICAL EXAM       ED Triage Vitals   BP Temp Temp Source Heart Rate Resp SpO2 Height Weight   06/21/22 0437 06/21/22 0434 06/21/22 0434 06/21/22 0434 06/21/22 0437 06/21/22 0434 -- 06/21/22 0434   (!) 213/84 (!) 96.3 °F (35.7 °C) Tympanic 82 15 96 %  132 lb (59.9 kg)       Physical Exam  Vitals reviewed. Constitutional:       General: She is not in acute distress. Appearance: She is not ill-appearing, toxic-appearing or diaphoretic. Comments: Appears uncomfortable   HENT:      Head: Normocephalic and atraumatic.    Cardiovascular:      Rate and Rhythm: Normal rate and regular rhythm. Heart sounds: No murmur heard. Pulmonary:      Effort: Pulmonary effort is normal. No respiratory distress. Breath sounds: Normal breath sounds. No stridor. No wheezing, rhonchi or rales. Abdominal:      General: There is no distension. Palpations: Abdomen is soft. There is no mass. Tenderness: There is no abdominal tenderness. There is no guarding or rebound. Musculoskeletal:      Cervical back: Neck supple. No rigidity or tenderness. Comments: No C/T/L-spine tenderness to palpation, step-off or deformity. Tenderness within the left sciatic notch. Positive left straight leg raise; negative right straight leg raise. Lymphadenopathy:      Cervical: No cervical adenopathy. Skin:     General: Skin is warm and dry. Coloration: Skin is not jaundiced or pale. Neurological:      General: No focal deficit present. Mental Status: She is alert and oriented to person, place, and time. Sensory: No sensory deficit. Motor: No weakness. Psychiatric:         Mood and Affect: Mood normal.         Behavior: Behavior normal.         DIAGNOSTIC RESULTS     RADIOLOGY:     Interpretation per the Radiologist below, if available at the time of this note:    Harsh   Final Result   1. No acute fracture. Small Schmorl's nodes are noted. 2. Multilevel degenerative changes seen in the lumbar spine, with minimal   L3-L4 and L4-L5 disc bulges, as well as mild disc bulge at L5-S1.   3. Bilateral moderate to severe osseous foraminal stenosis, left greater than   right at L5-S1. LABS:  Labs Reviewed   GLUCOSE, WHOLE BLOOD - Abnormal; Notable for the following components:       Result Value    POC Glucose 167 (*)     All other components within normal limits       All other labs were within normal range or not returned as of this dictation.     EMERGENCY DEPARTMENT COURSE and DIFFERENTIAL DIAGNOSIS/MDM:     Patient uncomfortable in appearance but no distress. Complaining of chronic, though progressively worsening, left-sided sciatica pain. On exam, findings are consistent with sciatica as there is a positive straight leg raise and pain within the sciatic notch. Otherwise, the exam is generally unremarkable and demonstrates no neurologic findings of concern. Given her age and the lack of prior imaging, CT imaging was performed of the lumbar spine today to evaluate for possible compression fracture. This demonstrates multiple degenerative changes as well as osseous foraminal stenosis at L5-S1, greater on the left side, consistent with the patient's exam.    She was given a single dose of intravenous fentanyl and, subsequently, a single 5 mg Norco which resulted in substantial improvement in the pain and no evident changes in mental status or other findings of concern. Given the significant pain she was experiencing tonight as well as her allergy to Daypro, I do not feel that hydrocodone may be necessary for this significant pain. I have cautioned about appropriate use and recommend starting with a half a tablet as needed. It is noted that she was significantly hypertensive in the ED. However, she is asymptomatic in this respect. Given this, I do not feel that a steroid prescription is appropriate at this time. Patient has been referred to establish care with orthopedic spine surgery for evaluation and better pain management options. She understands necessary return precautions. Also referred back to PCP for evaluation with respect to her blood pressure. FINAL IMPRESSION      1. Sciatica of left side    2.  Elevated blood pressure reading          DISPOSITION/PLAN     DISPOSITION Decision To Discharge 06/21/2022 05:54:36 AM      PATIENT REFERRED TO:  Sarai Castaneda MD  [address] [facility] [address] [address] [facility]  [phone]    Call today  Call to schedule an appointment at a location with a [facility] [address], [address] [id number]  [phone]    Schedule an appointment as soon as possible for a visit in 1 day  Blood pressure evaluation      (Please note that portions of this note were completed with a voice recognition program.  Efforts were made to edit the dictations but occasionally words are mis-transcribed.)    Kari Barry MD (electronically signed)  Attending Emergency Physician            Kari Barry MD  06/21/22 9886

## 2022-06-23 ENCOUNTER — HOSPITAL ENCOUNTER (OUTPATIENT)
Dept: PHYSICAL THERAPY | Age: 72
Setting detail: THERAPIES SERIES
Discharge: HOME | End: 2022-06-23
Payer: MEDICARE

## 2022-06-23 PROCEDURE — 97110 THERAPEUTIC EXERCISES: CPT

## 2022-06-23 PROCEDURE — 97161 PT EVAL LOW COMPLEX 20 MIN: CPT

## 2022-06-23 ASSESSMENT — PAIN SCALES - QUEBEC BACK PAIN DISABILITY SCALE
CARRY TWO BAGS OF GROCERIES: 2
QUEBEC DISABILITY INDEX: 40-59%
WALK A FEW BLOCKS OR 300 TO 400M: 1
MAKE YOUR BED: 2
BEND OVER TO CLEAN THE BATHTUB: 4
PUT ON SOCKS OR PANYHOSE: 1
CLIMB ONE FLIGHT OF STAIRS: 1
GET OUT OF BED: 3
QUEBEC CMS MODIFIER: CK
LIFT AND CARRY A HEAVY SUITCASE: 4
SLEEP THROUGH THE NIGHT: 5
PULL OR PUSH HEAVY DOORS: 2
THROW A BALL: 5
MOVE A CHAIR: 2
WALK SEVERAL KILOMETERS  OR MILES: 3
RIDE IN A CAR: 2
REACH UP TO HIGH SHELVES: 1
RUN ONE BLOCK OR 100M: 5
TOTAL SCORE: 49
STAND UP FOR 20 TO 30 MINUTES: 1
SIT IN A CHAIR FOR SEVERAL HOURS: 2
TURN OVER IN BED: 2
TAKE FOOD OUT OF THE REFRIGERATOR: 1

## 2022-06-23 NOTE — PLAN OF CARE
[facility]           Phone: [phone]             Outpatient Physical Therapy  Fax: [phone]                                           Date: 2022  Patient: Helen Meneses : 1950 CSN #: [id number]   Referring Physician: Francisco Brannon MD  Referral Date:   2022       [x] Plan of Care   [] Updated Plan of Care    Dates of Service to Include: 2022 to 22    Diagnosis:  Lumbar foraminal stenosis, M48.061    Rehab (Treatment) Diagnosis:  Low back pain; L proximal HS strain             Onset Date:       Attendance  Total # of Visits to Date: 1 No Show: 0 Canceled Appointment: 0    Assessment  Assessment: Patient is 71year old female with dx of lumbar foraminal stenosis who presents with increased pain 8/10 at worst in L proximal HS and L lower back, worse with sitting and better in standing. Patient stands with lumbar hyperlordosis and B anterior pelvic tilt; (-) B Slump, SLR, (-) B MANUEL, good pelvic alignment, stretching pain with lack of TKE with HS 90/90 test, TTP L proximal HS tendon. Patient with decreased L hip flexion and knee flex strength 4-/5 to 4/5 grossly limited d/t pain. Patient with decreased core strength: 4-/5 grossly. Patient to benefit from physical therapy to decrease pain and improve core strength and lumbar/hip stability and mobility to return to PLOF. Goals  Short Term Goals  Time Frame for Short term goals: 3 weeks  Short term goal 1: Patient to initiate HEP for improved core strength, lumbar stability and L HS flexibility. Short term goal 2: Patient to be instructed in core strengthening to improve lumbar stability and decrease radicular pain. Short term goal 3: Initiate manual techniques/modalities prn to decrease L proximal HS pain and improve mobility.   Long Term Goals  Time Frame for Long term goals : 6 weeks  Long term goal 1: Patient to be independent

## 2022-06-23 NOTE — PROGRESS NOTES
Phone: [address] [facility]          Fax: [phone]                      Outpatient Physical Therapy                                                                      Evaluation  Date: 2022  Patient: Les Prince  : 1950  [facility] #: [id number]    Referring Physician: Claudia Porter MD     Medical Diagnosis: Lumbar foraminal stenosis, M48.061    Treatment Diagnosis: Low back pain; L proximal HS strain  PT Insurance Information: Medicare  Total # of Visits Approved: 12   Total # of Visits to Date: 1  No Show: 0  Canceled Appointment: 0     Subjective  Subjective: Patient reports pain 8/10 at worst and can't sleep at night in her bed. Taking pain medication and it helps with the pain. Sitting makes the pain worse and standing makes the pain better. Pain has been getting worse the past couple months.   Additional Pertinent Hx: HTN, DM, depression, breast cancer    Observations:   General Observations  Description: Patient stands with lumbar hyperlordosis and B anterior pelvic tilt; (-) B Slump, SLR, (-) B MANUEL, good pelvic alignment, stretching pain with lack of TKE with HS 90/90 test, TTP L proximal HS tendon    Objective    Strength       Strength LLE  L Hip Flexion: 4/5  L Hip ABduction: 4/5  L Hip ADduction: 4+/5  L Knee Flexion: 4/5,4-/5  L Knee Extension: 4+/5       Lumbar Assessment     AROM Lumbar Spine   Lumbar spine general AROM: flexion: 8in from floor with pain; B SB: 1in from knees with pain with LSB     Special Tests:     Strength RLE  R Hip Flexion: 4/5  R Hip ABduction: 4/5  R Hip ADduction: 4+/5  R Knee Flexion: 4/5  R Knee Extension: 4+/5  Strength LLE  L Hip Flexion: 4/5  L Hip ABduction: 4/5  L Hip ADduction: 4+/5  L Knee Flexion: 4/5,4-/5  L Knee Extension: 4+/5    Exercises:  Exercise 1: HEP: PPT, marching, glut sets, DKTC, LTR, supine HS stretch with strap    Manual:  Joint Mobilization: Passive L HS stretching       Functional Outcome Measures  Get out of bed: Fairly difficult  Sleep through the night: Unable to do  Turn over in bed: Somewhat difficult  Ride in a car: Somewhat difficult  Stand up for 20-30 minutes: Minimally difficult  Sit in a chair for several hours: Somewhat difficult  Climb one flight of stairs: Minimally difficult  Walk a few blocks (300-400 m): Minimally difficult  Walk several kilometers - miles: Fairly difficult  Reach up to high shelves: Minimally difficult  Throw a ball: Unable to do  Run one block (about 100 m): Unable to do  Take food out of the refrigerator: Minimally difficult  Make your bed: Somewhat difficult  Put on socks (pantyhose): Minimally difficult  Bend over to clean the bathtub: Very difficult  Move a chair: Somewhat difficult  Pull or push heavy doors: Somewhat difficult  Carry two bags of groceries: Somewhat difficult  Lift and carry a heavy suitcase: Very difficult  Ilya Total Score: 49    Assessment  Assessment: Patient is 71year old female with dx of lumbar foraminal stenosis who presents with increased pain 8/10 at worst in L proximal HS and L lower back, worse with sitting and better in standing. Patient stands with lumbar hyperlordosis and B anterior pelvic tilt; (-) B Slump, SLR, (-) B MANUEL, good pelvic alignment, stretching pain with lack of TKE with HS 90/90 test, TTP L proximal HS tendon. Patient with decreased L hip flexion and knee flex strength 4-/5 to 4/5 grossly limited d/t pain. Patient with decreased core strength: 4-/5 grossly. Patient to benefit from physical therapy to decrease pain and improve core strength and lumbar/hip stability and mobility to return to PLOF. Therapy Prognosis: Good        Decision Making: Low Complexity    Patient Education  PT eval, POC, HEP    Pt verbalized/demonstrated good understanding:     [X] Yes         [] No, pt required further clarification.       Goals  Short Term Goals  Time Frame for Short term goals: 3 weeks  Short term goal 1: Patient to initiate HEP for improved core strength, lumbar stability and L HS flexibility. Short term goal 2: Patient to be instructed in core strengthening to improve lumbar stability and decrease radicular pain. Short term goal 3: Initiate manual techniques/modalities prn to decrease L proximal HS pain and improve mobility. Long Term Goals  Time Frame for Long term goals : 6 weeks  Long term goal 1: Patient to be independent and compliant with HEP. Long term goal 2: Patient to have improved core and B hip strength >/=4+/5 grossly for improved lumbar stability. Long term goal 3: Patient to have improve lumbar AROM flexion: 4 in from floor with no increase in pain and improved L HS mobility with TKE with HS 90/90 test to improve functional mobility. Long term goal 4: Patient to report >/=75% improvement in symptoms for improved QOL.       Minutes Tracking:  Time In: 6941  Time Out: 1983  Minutes: 30  Timed Code Treatment Minutes: [id number] [facility] Tori Cardoso, PT, DPT    6/23/2022

## 2022-06-29 ENCOUNTER — HOSPITAL ENCOUNTER (OUTPATIENT)
Dept: PHYSICAL THERAPY | Age: 72
Setting detail: THERAPIES SERIES
Discharge: HOME | End: 2022-06-29
Payer: MEDICARE

## 2022-06-29 PROCEDURE — 97110 THERAPEUTIC EXERCISES: CPT

## 2022-06-29 PROCEDURE — 97140 MANUAL THERAPY 1/> REGIONS: CPT

## 2022-06-29 NOTE — PROGRESS NOTES
Phone: [facility]           Fax: [phone]                           Outpatient Physical Therapy                                                                            Daily Note    Patient: Francy Spangler : 1950  CSN #: [id number]   Referring Physician: Lavonne Dang MD    Date: 2022     Treatment Diagnosis: Low back pain; L proximal HS strain     Total # of Visits Approved: 12 Per Physician Order  Total # of Visits to Date: 2  No Show: 0  Canceled Appointment: 0    Pre-Treatment Pain:  8/10  Subjective: Pt states she is still having alot of pain on the backside of her leg put shes been stretching. Pt rates current pain a -8/10. Exercises:  Exercise 2: SciFit bike 6 mins  Exercise 3: step stretch 3x30  Exercise 4: ball rollout 3x30  Exercise 5: seated hamstring stretch 3x30    Manual:  Joint Mobilization: Passive L HS stretching  Soft Tissue Mobilizaton: L hamstring STM using ball    Assessment  Assessment: Pt reports relief with use of manual STM today on L hamstring region. Reviewed stretches for HEP to decrease tension. Will continue. Activity Tolerance  Activity Tolerance: Patient tolerated treatment well    Patient Education  Patient Education: Ex rationale, Manual rationale. Pt verbalized/demonstrated good understanding:     [x] Yes         [] No, pt required further clarification. Post Treatment Pain:  7/10    Plan  Plan Frequency: 2  Plan weeks: 4-6     Goals  (Total # of Visits to Date: 2)      Short Term Goals  Time Frame for Short term goals: 3 weeks  Short term goal 1: Patient to initiate HEP for improved core strength, lumbar stability and L HS flexibility. -MET  Short term goal 2: Patient to be instructed in core strengthening to improve lumbar stability and decrease radicular pain. Short term goal 3: Initiate manual techniques/modalities prn to decrease L proximal HS pain and improve mobility.  -MET    Long Term Goals  Time Frame for Long term goals : 6 weeks  Long term goal 1: Patient to be independent and compliant with HEP. Long term goal 2: Patient to have improved core and B hip strength >/=4+/5 grossly for improved lumbar stability. Long term goal 3: Patient to have improve lumbar AROM flexion: 4 in from floor with no increase in pain and improved L HS mobility with TKE with HS 90/90 test to improve functional mobility. Long term goal 4: Patient to report >/=75% improvement in symptoms for improved QOL.     Minutes Tracking:  Time In: 6321  Time Out: 1200  Minutes: 43  Timed Code Treatment Minutes: Kaushal Bhatti          Date: 6/29/2022

## 2022-07-01 ENCOUNTER — HOSPITAL ENCOUNTER (OUTPATIENT)
Dept: PHYSICAL THERAPY | Age: 72
Setting detail: THERAPIES SERIES
Discharge: HOME | End: 2022-07-01
Payer: MEDICARE

## 2022-07-01 PROCEDURE — 97140 MANUAL THERAPY 1/> REGIONS: CPT

## 2022-07-01 PROCEDURE — 97110 THERAPEUTIC EXERCISES: CPT

## 2022-07-01 NOTE — PROGRESS NOTES
Phone: [facility]           Fax: [phone]                           Outpatient Physical Therapy                                                                            Daily Note    Patient: Kailash Montana : 1950  CSN #: [id number]   Referring Physician: Bacilio Mckeon MD    Date: 2022     Treatment Diagnosis: Low back pain; L proximal HS strain     Total # of Visits Approved: 12 Per Physician Order  Total # of Visits to Date: 3  No Show: 0  Canceled Appointment: 0    Pre-Treatment Pain:  6/10  Subjective: Pt reports she feels a little relief since last visit. Pt rates current pain a 6/10. Exercises:  Exercise 2: SciFit bike 6 mins  Exercise 3: step stretch 3x30  Exercise 4: ball rollout 3x30 -SKTC today  Exercise 5: seated hamstring stretch 3x30  Exercise 6: Supine nerve glides 10x 10 sec    Manual:  Soft Tissue Mobilizaton: L hamstring STM using thermoprobe hot 10 mins    Assessment  Assessment: Increased L hamstring tightness and discomfort noted today with supine nerve glides. Initaited thermoprobe to L hamstrings to decrease tightness. Will continue. Activity Tolerance  Activity Tolerance: Patient tolerated treatment well    Patient Education  Patient Education: New ex rationale. Pt verbalized/demonstrated good understanding:     [x] Yes         [] No, pt required further clarification. Post Treatment Pain:  6/10    Plan  Plan Frequency: 2  Plan weeks: 4-6     Goals  (Total # of Visits to Date: 3)      Short Term Goals  Time Frame for Short term goals: 3 weeks  Short term goal 1: Patient to initiate HEP for improved core strength, lumbar stability and L HS flexibility. -MET  Short term goal 2: Patient to be instructed in core strengthening to improve lumbar stability and decrease radicular pain. Short term goal 3: Initiate manual techniques/modalities prn to decrease L proximal HS pain and improve mobility.  -MET    Long Term

## 2022-07-06 ENCOUNTER — HOSPITAL ENCOUNTER (OUTPATIENT)
Dept: PHYSICAL THERAPY | Age: 72
Setting detail: THERAPIES SERIES
Discharge: HOME | End: 2022-07-06
Payer: MEDICARE

## 2022-07-06 PROCEDURE — 97110 THERAPEUTIC EXERCISES: CPT

## 2022-07-06 PROCEDURE — 97140 MANUAL THERAPY 1/> REGIONS: CPT

## 2022-07-06 NOTE — PROGRESS NOTES
Phone: [facility]           Fax: [phone]                           Outpatient Physical Therapy                                                                            Daily Note    Patient: Alannah Sarmiento : 1950  CSN #: [id number]   Referring Physician: Corina Zabala MD    Date: 2022     Treatment Diagnosis: Low back pain; L proximal HS strain     Total # of Visits Approved: 12 Per Physician Order  Total # of Visits to Date: 4  No Show: 0  Canceled Appointment: 0    Pre-Treatment Pain:  6/10  Subjective: Pt states she still has pain on the backside of her leg but shes feeling a little better. Pt rates current pain a 5-6/10. Exercises:  Exercise 2: SciFit bike 8 mins 2.5  Exercise 3: step stretch 3x30  Exercise 4: ball rollout 3x30 -SKTC today  Exercise 5: seated hamstring stretch 3x30  Exercise 6: Supine nerve glides 10x 10 sec  Exercise 7: sideways amb at counter (GTB) 3 laps  Exercise 8: hip ABD/ retro diag GTB 15x ea    Manual:  Soft Tissue Mobilizaton: L hamstring STM using thermoprobe hot 10 mins    Assessment  Assessment: Increased HEP with resisted sideways amb, step ups and sit to stands. Pt continues to display tightness throughout L hamstring but relief felt with manual techniques. Activity Tolerance  Activity Tolerance: Patient tolerated treatment well    Patient Education  Patient Education: New HEP  Pt verbalized/demonstrated good understanding:     [x] Yes         [] No, pt required further clarification. Post Treatment Pain:  6/10    Plan  Plan Frequency: 2  Plan weeks: 4-6     Goals  (Total # of Visits to Date: 4)      Short Term Goals  Time Frame for Short term goals: 3 weeks  Short term goal 1: Patient to initiate HEP for improved core strength, lumbar stability and L HS flexibility. -MET  Short term goal 2: Patient to be instructed in core strengthening to improve lumbar stability and decrease radicular pain.   Short

## 2022-07-08 ENCOUNTER — HOSPITAL ENCOUNTER (OUTPATIENT)
Dept: PHYSICAL THERAPY | Age: 72
Setting detail: THERAPIES SERIES
Discharge: HOME | End: 2022-07-08
Payer: MEDICARE

## 2022-07-08 PROCEDURE — 97110 THERAPEUTIC EXERCISES: CPT

## 2022-07-08 PROCEDURE — 97140 MANUAL THERAPY 1/> REGIONS: CPT

## 2022-07-08 NOTE — PROGRESS NOTES
Phone: [facility]           Fax: [phone]                           Outpatient Physical Therapy                                                                            Daily Note    Patient: Sierra Esposito : 1950  CSN #: [id number]   Referring Physician: Eduin Choi MD    Date: 2022     Treatment Diagnosis: Low back pain; L proximal HS strain     Total # of Visits Approved: 12 Per Physician Order  Total # of Visits to Date: 5  No Show: 0  Canceled Appointment: 0    Pre-Treatment Pain:  4/10  Subjective: Pt reports she is starting to feel better. Pt  reports she isnt saying much about pain at home anymore so he feels its working. Pt rates current pain a 4/10. Exercises:  Exercise 2: SciFit bike 8 mins 2.5  Exercise 3: step stretch 3x30  Exercise 5: seated hamstring stretch 3x30  Exercise 6: Supine nerve glides 10x 10 sec  Exercise 7: sideways amb at counter (GTB) 3 laps  Exercise 8: hip ABD/ retro diag GTB 15x ea  Exercise 9: FSU/ LSU 15x 6 inch  Exercise 10: sit<>stand 10x2    Manual:  Soft Tissue Mobilizaton: L hamstring STM using thermoprobe hot 10 mins    Assessment  Assessment: Less point tenderness noted today with palpation throughout L proximal hamstring. Continued to advance strengthening exercises with fair tolerance noted. Activity Tolerance  Activity Tolerance: Patient tolerated treatment well    Patient Education  Patient Education: New HEP  Pt verbalized/demonstrated good understanding:     [x] Yes         [] No, pt required further clarification. Post Treatment Pain:  4/10    Plan  Plan Frequency: 2  Plan weeks: 4-6     Goals  (Total # of Visits to Date: 5)      Short Term Goals  Time Frame for Short term goals: 3 weeks  Short term goal 1: Patient to initiate HEP for improved core strength, lumbar stability and L HS flexibility.  -MET  Short term goal 2: Patient to be instructed in core strengthening to improve lumbar stability and decrease radicular pain. Short term goal 3: Initiate manual techniques/modalities prn to decrease L proximal HS pain and improve mobility. -MET    Long Term Goals  Time Frame for Long term goals : 6 weeks  Long term goal 1: Patient to be independent and compliant with HEP. Long term goal 2: Patient to have improved core and B hip strength >/=4+/5 grossly for improved lumbar stability. Long term goal 3: Patient to have improve lumbar AROM flexion: 4 in from floor with no increase in pain and improved L HS mobility with TKE with HS 90/90 test to improve functional mobility. Long term goal 4: Patient to report >/=75% improvement in symptoms for improved QOL.     Minutes Tracking:  Time In: 1101  Time Out: 9231  Minutes: 46  Timed Code Treatment Minutes: [address] [place], [place]          Date: 7/8/2022

## 2022-07-12 ENCOUNTER — HOSPITAL ENCOUNTER (OUTPATIENT)
Dept: PHYSICAL THERAPY | Age: 72
Setting detail: THERAPIES SERIES
Discharge: HOME | End: 2022-07-12
Payer: MEDICARE

## 2022-07-12 PROCEDURE — 97140 MANUAL THERAPY 1/> REGIONS: CPT

## 2022-07-12 PROCEDURE — 97110 THERAPEUTIC EXERCISES: CPT

## 2022-07-12 NOTE — PROGRESS NOTES
Phone: Donta           Fax: [phone]                           Outpatient Physical Therapy                                                                            Daily Note    Patient: Jorge Mcdermott : 1950  CSN #: [id number]   Referring Physician: Mindy Golden MD    Date: 2022     Treatment Diagnosis: Low back pain; L proximal HS strain     Total # of Visits Approved: 12 Per Physician Order  Total # of Visits to Date: 6  No Show: 0  Canceled Appointment: 0    Pre-Treatment Pain:  2/10  Subjective: Pt states she is now weaning off her steroids and she noticed feeling a little wobbly last night but still feeling better. Pt rates current pain a 2/10 in L leg. Exercises:  Exercise 1: HEP: PPT, marching, glut sets, DKTC, LTR, supine HS stretch with strap  Exercise 2: SciFit bike 8 mins 2.5  Exercise 3: step stretch 3x30  Exercise 5: seated hamstring stretch 3x30  Exercise 7: sideways amb at counter (GTB) 3 laps  Exercise 8: hip ABD/ retro diag GTB 15x ea  Exercise 9: FSU/ LSU 15x 6 inch  Exercise 10: sit<>stand 10x2  Exercise 11: 6 inch keara taps 10x ea  Exercise 12: supine bridges, GTB supine clams, PPT 15x ea    Manual:  Soft Tissue Mobilizaton: L hamstring STM using thermoprobe hot 10 mins  Other: MFD cupping, 1 cup static 1 pump L ischial tuberosity 3 mins static    Assessment  Assessment: MFD cupping completed today to L ischial tuberosity along with thermoprobe to decrease tightness. Increased strengthening exercises with fair tolerance noted. Activity Tolerance  Activity Tolerance: Patient tolerated treatment well    Patient Education  Patient Education: Cont HEP. Pt verbalized/demonstrated good understanding:     [x] Yes         [] No, pt required further clarification.      Post Treatment Pain:  2/10    Plan  Plan Frequency: 2  Plan weeks: 4-6     Goals  (Total # of Visits to Date: 6)      Short Term Goals  Time Frame for

## 2022-07-14 ENCOUNTER — HOSPITAL ENCOUNTER (OUTPATIENT)
Dept: PHYSICAL THERAPY | Age: 72
Setting detail: THERAPIES SERIES
Discharge: HOME | End: 2022-07-14
Payer: MEDICARE

## 2022-07-14 PROCEDURE — 97140 MANUAL THERAPY 1/> REGIONS: CPT

## 2022-07-14 PROCEDURE — 97110 THERAPEUTIC EXERCISES: CPT

## 2022-07-14 NOTE — PROGRESS NOTES
Phone: Donta           Fax: [phone]                           Outpatient Physical Therapy                                                                            Daily Note    Patient: Lisa Peters : 1950  CSN #: [id number]   Referring Physician: Brian Ann MD    Date: 2022    Diagnosis: Lumbar foraminal stenosis, M48.061  Treatment Diagnosis: Low back pain; L proximal HS strain    PT Insurance Information: Medicare  Total # of Visits Approved: 12 Per Physician Order  Total # of Visits to Date: 7  No Show: 0  Canceled Appointment: 0      Pre-Treatment Pain:  3/10  Subjective: Patient reports L proximal HS pain about 2-3/10. Exercises:  Exercise 2: SciFit bike 8 mins 3.5  Exercise 3: step stretch 3x30  Exercise 5: seated hamstring stretch 3x30  Exercise 7: sideways amb at counter (GTB) 3 laps  Exercise 8: hip ABD/ retro diag GTB 15x ea  Exercise 9: FSU/ LSU 15x 6 inch  Exercise 10: sit<>stand 10x2    Manual:  Soft Tissue Mobilizaton: L hamstring STM using thermoprobe hot 10 mins  Other: IDN with static placement x5min 6 needles surrounding L proximal HS tendon/muscle belly to decrease tone and stimulate healing    Assessment  Assessment: Patient with improved lumbar flexion AROM 4in from floor today but with pain still present in L proximal HS, progressing toward LTG. Initiated IDN with static placement to L prox HS tendon and muscle belly to decrease tone and stimulate healing response of tissues. Patient with good tolerance. Followed with 8min heated thermoprobe DTM to decrease soreness. Will continue to progress. Activity Tolerance  Activity Tolerance: Patient tolerated treatment well    Patient Education  Exercise technique/rationale for IDN    Pt verbalized/demonstrated good understanding:     [x] Yes         [] No, pt required further clarification.        Post Treatment Pain:  2/10      Plan  Plan Frequency: 2  Plan weeks: 4-6       Goals  (Total # of Visits to Date: 7)      Short Term Goals  Time Frame for Short term goals: 3 weeks  Short term goal 1: Patient to initiate HEP for improved core strength, lumbar stability and L HS flexibility. -MET  Short term goal 2: Patient to be instructed in core strengthening to improve lumbar stability and decrease radicular pain.-met  Short term goal 3: Initiate manual techniques/modalities prn to decrease L proximal HS pain and improve mobility. -MET    Long Term Goals  Time Frame for Long term goals : 6 weeks  Long term goal 1: Patient to be independent and compliant with HEP. Long term goal 2: Patient to have improved core and B hip strength >/=4+/5 grossly for improved lumbar stability. Long term goal 3: Patient to have improve lumbar AROM flexion: 4 in from floor with no increase in pain and improved L HS mobility with TKE with HS 90/90 test to improve functional mobility. -progressing  Long term goal 4: Patient to report >/=75% improvement in symptoms for improved QOL.     Minutes Tracking:  Time In: 7039  Time Out: 8630  Minutes: 50  Timed Code Treatment Minutes: [facility], PT , DPT     Date: 7/14/2022

## 2022-07-18 RX ORDER — ANASTROZOLE 1 MG/1
TABLET ORAL
Qty: 30 TABLET | Refills: 1 | Status: SHIPPED | OUTPATIENT
Start: 2022-07-18 | End: 2022-09-08

## 2022-07-19 ENCOUNTER — HOSPITAL ENCOUNTER (OUTPATIENT)
Dept: PHYSICAL THERAPY | Age: 72
Setting detail: THERAPIES SERIES
Discharge: HOME | End: 2022-07-19
Payer: MEDICARE

## 2022-07-19 PROCEDURE — 97110 THERAPEUTIC EXERCISES: CPT

## 2022-07-19 PROCEDURE — 97140 MANUAL THERAPY 1/> REGIONS: CPT

## 2022-07-20 NOTE — PROGRESS NOTES
Phone: Donta           Fax: [phone]                           Outpatient Physical Therapy                                                                            Daily Note    Patient: Annie Ball : 1950  CSN #: [id number]   Referring Physician: Hoda Tate MD    Date: 2022     Treatment Diagnosis: Low back pain; L proximal HS strain     Total # of Visits Approved: 12 Per Physician Order  Total # of Visits to Date: 8  No Show: 0  Canceled Appointment: 0    Pre-Treatment Pain:  2/10  Subjective: Pt states she went to Dr and they are happy with progress so far and released her from their care unless they need anything else. Pt reports she still gets pain but its much better. Pt rates current pain a 2/10. Exercises:  Exercise 1: HEP: PPT, marching, glut sets, DKTC, LTR, supine HS stretch with strap  Exercise 2: SciFit bike 8 mins 3.5  Exercise 3: step stretch 3x30  Exercise 5: seated hamstring stretch 3x30  Exercise 8: hip ABD/ retro diag GTB 15x ea  Exercise 9: FSU/ LSU 15x 6 inch  Exercise 10: sit<>stand 10x2  Exercise 12: supine bridges, GTB supine clams, PPT 15x ea    Manual:  Soft Tissue Mobilizaton: L hamstring STM using thermoprobe hot 10 mins    Assessment  Assessment: Discomfort remains with deep tissue massage on L ischial tuberosity but improvement since begining therapy. Reviewed HEP for greater mobility. Activity Tolerance  Activity Tolerance: Patient tolerated treatment well    Patient Education  Patient Education: Cont HEP. Pt verbalized/demonstrated good understanding:     [x] Yes         [] No, pt required further clarification. Post Treatment Pain:  2/10    Plan  Plan Frequency: 2  Plan weeks: 4-6     Goals  (Total # of Visits to Date: 8)      Short Term Goals  Time Frame for Short term goals: 3 weeks  Short term goal 1: Patient to initiate HEP for improved core strength, lumbar stability and L HS flexibility. -MET  Short term goal 2: Patient to be instructed in core strengthening to improve lumbar stability and decrease radicular pain.-met  Short term goal 3: Initiate manual techniques/modalities prn to decrease L proximal HS pain and improve mobility. -MET    Long Term Goals  Time Frame for Long term goals : 6 weeks  Long term goal 1: Patient to be independent and compliant with HEP. Long term goal 2: Patient to have improved core and B hip strength >/=4+/5 grossly for improved lumbar stability. Long term goal 3: Patient to have improve lumbar AROM flexion: 4 in from floor with no increase in pain and improved L HS mobility with TKE with HS 90/90 test to improve functional mobility. -progressing  Long term goal 4: Patient to report >/=75% improvement in symptoms for improved QOL.     Minutes Tracking:  Time In: 1401  Time Out: 3067  Minutes: 43  Timed Code Treatment Minutes: [address]           Date: 7/19/2022

## 2022-07-21 ENCOUNTER — HOSPITAL ENCOUNTER (OUTPATIENT)
Dept: PHYSICAL THERAPY | Age: 72
Setting detail: THERAPIES SERIES
Discharge: HOME | End: 2022-07-21
Payer: MEDICARE

## 2022-07-21 PROCEDURE — 97110 THERAPEUTIC EXERCISES: CPT

## 2022-07-21 PROCEDURE — 97140 MANUAL THERAPY 1/> REGIONS: CPT

## 2022-07-22 NOTE — PROGRESS NOTES
Phone: Donta           Fax: [phone]                           Outpatient Physical Therapy                                                                            Daily Note    Patient: Kaylen Jacobo : 1950  CSN #: [id number]   Referring Physician: Gloria Melendez MD    Date: 2022       Treatment Diagnosis: Low back pain; L proximal HS strain       Total # of Visits Approved: 12 Per Physician Order  Total # of Visits to Date: 9  No Show: 0  Canceled Appointment: 0    Pre-Treatment Pain:  0/10  Subjective: Pt reports overall she is feeling pretty good. Pt reports she still has some pain at times but its much better. Exercises:  Exercise 2: SciFit bike 8 mins 3.5  Exercise 3: step stretch 3x30  Exercise 5: seated hamstring stretch 3x30  Exercise 7: sideways amb at counter (GTB) 3 laps  Exercise 9: FSU/ LSU 15x 6 inch  Exercise 10: sit<>stand 10x2    Manual:  Soft Tissue Mobilizaton: L hamstring STM using thermoprobe hot 10 mins    Assessment  Assessment: Pt reports pain ranges from a 0-2/10 on day to day basis but overall usually pretty low. Pt current lumbar spine AROM forward flexion fingertips 2.5 inches to floor, extend to neutral and R/L sidebend singertips just proximal to knee joint. Current Tera hip and knee MMT grossly 4+ to 5/5. Pt able to complete 9 sit<>stands in 30 sec without UE assist.  Plan is to d/c to HEP at this point d/t Pt feeling like they are doing much better and not needing current formal therapy. Activity Tolerance  Activity Tolerance: Patient tolerated treatment well    Patient Education  Patient Education: Reviewed HEP for d/c.  Pt verbalized/demonstrated good understanding:     [x] Yes         [] No, pt required further clarification.      Post Treatment Pain:  0/10    Plan  Plan Frequency: 2  Plan weeks: 4-6       Goals  (Total # of Visits to Date: 5)      Short Term Goals  Time Frame for Short term goals: 3 weeks  Short term goal 1: Patient to initiate HEP for improved core strength, lumbar stability and L HS flexibility. -MET  Short term goal 2: Patient to be instructed in core strengthening to improve lumbar stability and decrease radicular pain.-met  Short term goal 3: Initiate manual techniques/modalities prn to decrease L proximal HS pain and improve mobility. -MET    Long Term Goals  Time Frame for Long term goals : 6 weeks  Long term goal 1: Patient to be independent and compliant with HEP. Long term goal 2: Patient to have improved core and B hip strength >/=4+/5 grossly for improved lumbar stability. Long term goal 3: Patient to have improve lumbar AROM flexion: 4 in from floor with no increase in pain and improved L HS mobility with TKE with HS 90/90 test to improve functional mobility. -progressing  Long term goal 4: Patient to report >/=75% improvement in symptoms for improved QOL.     Minutes Tracking:  Time In: 7838  Time Out: 3997  Minutes: 48  Timed Code Treatment Minutes: Tamy Lopes [place]         Date: 7/21/2022

## 2022-07-23 ENCOUNTER — HOSPITAL ENCOUNTER (EMERGENCY)
Age: 72
Discharge: HOME | End: 2022-07-24
Attending: EMERGENCY MEDICINE
Payer: MEDICARE

## 2022-07-23 DIAGNOSIS — I10 ESSENTIAL HYPERTENSION: Primary | ICD-10-CM

## 2022-07-23 LAB
-: NORMAL
ANION GAP SERPL CALCULATED.3IONS-SCNC: 11 MMOL/L (ref 9–17)
BILIRUBIN URINE: NEGATIVE
BUN BLDV-MCNC: 20 MG/DL (ref 8–23)
BUN/CREAT BLD: 20 (ref 9–20)
CALCIUM SERPL-MCNC: 9.7 MG/DL (ref 8.6–10.4)
CHLORIDE BLD-SCNC: 100 MMOL/L (ref 98–107)
CO2: 27 MMOL/L (ref 20–31)
COLOR: YELLOW
CREAT SERPL-MCNC: 1 MG/DL (ref 0.5–0.9)
EPITHELIAL CELLS UA: NORMAL /HPF (ref 0–25)
GFR AFRICAN AMERICAN: >60 ML/MIN
GFR NON-AFRICAN AMERICAN: 55 ML/MIN
GFR SERPL CREATININE-BSD FRML MDRD: ABNORMAL ML/MIN/{1.73_M2}
GFR SERPL CREATININE-BSD FRML MDRD: ABNORMAL ML/MIN/{1.73_M2}
GLUCOSE BLD-MCNC: 184 MG/DL (ref 70–99)
GLUCOSE URINE: NEGATIVE
HCT VFR BLD CALC: 42.6 % (ref 36.3–47.1)
HEMOGLOBIN: 14.4 G/DL (ref 11.9–15.1)
KETONES, URINE: NEGATIVE
LEUKOCYTE ESTERASE, URINE: ABNORMAL
MCH RBC QN AUTO: 30.8 PG (ref 25.2–33.5)
MCHC RBC AUTO-ENTMCNC: 33.8 G/DL (ref 28.4–34.8)
MCV RBC AUTO: 91.2 FL (ref 82.6–102.9)
NITRITE, URINE: NEGATIVE
NRBC AUTOMATED: 0 PER 100 WBC
PDW BLD-RTO: 11.8 % (ref 11.8–14.4)
PH UA: 7.5 (ref 5–9)
PLATELET # BLD: 221 K/UL (ref 138–453)
PMV BLD AUTO: 10.1 FL (ref 8.1–13.5)
POTASSIUM SERPL-SCNC: 3.9 MMOL/L (ref 3.7–5.3)
PROTEIN UA: NEGATIVE
RBC # BLD: 4.67 M/UL (ref 3.95–5.11)
RBC UA: NORMAL /HPF (ref 0–2)
SODIUM BLD-SCNC: 138 MMOL/L (ref 135–144)
SPECIFIC GRAVITY UA: 1.01 (ref 1.01–1.02)
TROPONIN, HIGH SENSITIVITY: 10 NG/L (ref 0–14)
TSH SERPL DL<=0.05 MIU/L-ACNC: 31.25 UIU/ML (ref 0.3–5)
TURBIDITY: CLEAR
URINE HGB: NEGATIVE
UROBILINOGEN, URINE: NORMAL
WBC # BLD: 10.4 K/UL (ref 3.5–11.3)
WBC UA: NORMAL /HPF (ref 0–5)

## 2022-07-23 PROCEDURE — 80048 BASIC METABOLIC PNL TOTAL CA: CPT

## 2022-07-23 PROCEDURE — 81001 URINALYSIS AUTO W/SCOPE: CPT

## 2022-07-23 PROCEDURE — 99284 EMERGENCY DEPT VISIT MOD MDM: CPT

## 2022-07-23 PROCEDURE — 36415 COLL VENOUS BLD VENIPUNCTURE: CPT

## 2022-07-23 PROCEDURE — 93005 ELECTROCARDIOGRAM TRACING: CPT | Performed by: EMERGENCY MEDICINE

## 2022-07-23 PROCEDURE — 85027 COMPLETE CBC AUTOMATED: CPT

## 2022-07-23 PROCEDURE — 84443 ASSAY THYROID STIM HORMONE: CPT

## 2022-07-23 PROCEDURE — 84484 ASSAY OF TROPONIN QUANT: CPT

## 2022-07-24 VITALS
HEART RATE: 83 BPM | DIASTOLIC BLOOD PRESSURE: 84 MMHG | SYSTOLIC BLOOD PRESSURE: 170 MMHG | TEMPERATURE: 97.2 F | OXYGEN SATURATION: 97 % | RESPIRATION RATE: 21 BRPM

## 2022-07-24 LAB
EKG ATRIAL RATE: 86 BPM
EKG P AXIS: 60 DEGREES
EKG P-R INTERVAL: 170 MS
EKG Q-T INTERVAL: 344 MS
EKG QRS DURATION: 80 MS
EKG QTC CALCULATION (BAZETT): 411 MS
EKG R AXIS: -6 DEGREES
EKG T AXIS: 57 DEGREES
EKG VENTRICULAR RATE: 86 BPM
PRO-BNP: 58 PG/ML
TROPONIN, HIGH SENSITIVITY: 9 NG/L (ref 0–14)

## 2022-07-24 PROCEDURE — 36415 COLL VENOUS BLD VENIPUNCTURE: CPT

## 2022-07-24 PROCEDURE — 6370000000 HC RX 637 (ALT 250 FOR IP): Performed by: EMERGENCY MEDICINE

## 2022-07-24 PROCEDURE — 84484 ASSAY OF TROPONIN QUANT: CPT

## 2022-07-24 PROCEDURE — 93010 ELECTROCARDIOGRAM REPORT: CPT | Performed by: INTERNAL MEDICINE

## 2022-07-24 PROCEDURE — 83880 ASSAY OF NATRIURETIC PEPTIDE: CPT

## 2022-07-24 RX ORDER — HYDROCHLOROTHIAZIDE 25 MG/1
25 TABLET ORAL ONCE
Status: COMPLETED | OUTPATIENT
Start: 2022-07-24 | End: 2022-07-24

## 2022-07-24 RX ADMIN — HYDROCHLOROTHIAZIDE 25 MG: 25 TABLET ORAL at 03:22

## 2022-07-24 ASSESSMENT — ENCOUNTER SYMPTOMS
VOMITING: 0
SORE THROAT: 0
WHEEZING: 0
SHORTNESS OF BREATH: 0
CONSTIPATION: 0
RHINORRHEA: 0
COUGH: 0
DIARRHEA: 0
NAUSEA: 0
ABDOMINAL DISTENTION: 0

## 2022-07-24 NOTE — ED PROVIDER NOTES
[facility] ED  Emergency Department        Pt Name: Kaylie Landa  MRN: [id number]  [place] 1950  Date of evaluation: 7/23/22    CHIEF COMPLAINT       Chief Complaint   Patient presents with    Hypertension     180/100 at home PTA, recent BP med increase yesterday by PCP, denies chest pain/SOB/headache, took extra 100mg losartan PTA    Fatigue     Pt c/o fatigue, increased weakness over last 2 months. States was on prednisone and has felt weakness since       HISTORY OF PRESENT ILLNESS  (Location/Symptom, Timing/Onset, Context/Setting, Quality, Duration, ModifyingFactors, Severity.)      Kaylie Landa is a 71 y.o. female who presents with history of fatigue. Recently had a sciatica nerve irritation was on a steroid taper for 5 weeks but finished about 2 weeks ago patient states she has not felt well since then. Has been having elevated blood pressure and elevated sugars. Was just started on Januvia with first dose today. Also had her home dose of losartan increased from 50 mg up to losartan first dose of the increased dose was given this morning. Patient did have elevated blood pressure readings in the 180s taken by her  today. She was feeling well today and tonight around 730 she got very lightheaded and dizzy. Felt weak. And a blood pressure was elevated  gave an additional dose of the 100 mg of losartan prior to coming to the emergency room. Patient continues to feel weak. No cough no runny nose no chest pain no shortness of breath. PAST MEDICAL / SURGICAL / SOCIAL / FAMILY HISTORY      has a past medical history of Chronic kidney disease, stage 3 ([address] Depression, Diabetes mellitus ([address] Endometriosis, History of breast cancer, Hyperlipidemia, Hypertension, and Hypothyroidism. has a past surgical history that includes Appendectomy (1993); Tonsillectomy (1961); Colonoscopy (2011); Breast lumpectomy (Left, 03/08/2017);  Hysterectomy, vaginal (1993); Cholecystectomy, laparoscopic (1994); and Cardiac catheterization (2005). Social History     Socioeconomic History    Marital status:      Spouse name: Not on file    Number of children: Not on file    Years of education: Not on file    Highest education level: Not on file   Occupational History    Not on file   Tobacco Use    Smoking status: Never    Smokeless tobacco: Never   Substance and Sexual Activity    Alcohol use: No    Drug use: No    Sexual activity: Not on file   Other Topics Concern    Not on file   Social History Narrative    Not on file     Social Determinants of Health     Financial Resource Strain: Low Risk     Difficulty of Paying Living Expenses: Not hard at all   Food Insecurity: No Food Insecurity    Worried About Running Out of Food in the Last Year: Never true    Ran Out of Food in the Last Year: Never true   Transportation Needs: Not on file   Physical Activity: Not on file   Stress: Not on file   Social Connections: Not on file   Intimate Partner Violence: Not on file   Housing Stability: Not on file       Family History   Problem Relation Age of Onset    Cancer Mother         bone    Heart Attack Father     Heart Disease Father     Asthma Brother     No Known Problems Sister     No Known Problems Sister        Allergies:  Tape [adhesive tape], Daypro [oxaprozin], Pcn [penicillins], Prednisone, Prilosec [omeprazole], Strawberry extract, Clindamycin/lincomycin, and Vancomycin    Home Medications:  Prior to Admission medications    Medication Sig Start Date End Date Taking?  Authorizing Provider   losartan (COZAAR) 100 MG tablet Take 1 tablet by mouth in the morning. 7/22/22   Gustavo Posey MD   SITagliptin (JANUVIA) 100 MG tablet Take 1 tablet by mouth in the morning. 7/22/22   Gustavo Posey MD   atorvastatin (LIPITOR) 20 MG tablet Take 1 tablet by mouth once daily 7/18/22   Gustavo Posey MD   anastrozole (ARIMIDEX) 1 MG tablet Take 1 tablet by mouth once daily 7/18/22   Eual Mention, MD MARTINEZYLE LITE strip USE 1 STRIP TO CHECK GLUCOSE ONCE DAILY AS DIRECTED 9/20/20   Historical Provider, MD   levothyroxine (SYNTHROID) 125 MCG tablet Take 125 mcg by mouth Daily    Historical Provider, MD   venlafaxine (EFFEXOR) 50 MG tablet Take 25 mg by mouth 3 times daily 10/11/17   Historical Provider, MD   Multiple Vitamins-Minerals (MULTIVITAMIN ADULT PO) Take by mouth daily    Historical Provider, MD   aspirin 81 MG tablet Take 81 mg by mouth daily. Historical Provider, MD       REVIEW OF SYSTEMS    (2-9 systems for level 4, 10 or more for level 5)      Review of Systems   Constitutional:  Positive for fatigue. Negative for activity change, appetite change and fever. HENT:  Negative for congestion, rhinorrhea and sore throat. Respiratory:  Negative for cough, shortness of breath and wheezing. Cardiovascular:  Negative for chest pain, palpitations and leg swelling. Gastrointestinal:  Negative for abdominal distention, constipation, diarrhea, nausea and vomiting. Genitourinary:  Negative for decreased urine volume and dysuria. Skin:  Negative for rash. Neurological:  Positive for dizziness and light-headedness. Negative for weakness, numbness and headaches. PHYSICAL EXAM   (up to 7 for level 4, 8 or more for level 5)     INITIAL VITALS:   BP (!) 170/84   Pulse 83   Temp 97.2 °F (36.2 °C) (Tympanic)   Resp 21   SpO2 97%     Physical Exam  Constitutional:       General: She is not in acute distress. Appearance: Normal appearance. She is not ill-appearing. HENT:      Head: Normocephalic and atraumatic. Eyes:      General:         Right eye: No discharge. Left eye: No discharge. Extraocular Movements: Extraocular movements intact. Pupils: Pupils are equal, round, and reactive to light. Cardiovascular:      Rate and Rhythm: Normal rate. Pulses: Normal pulses.    Pulmonary:      Effort: Pulmonary effort is normal. %    MCV 91.2 82.6 - 102.9 fL    MCH 30.8 25.2 - 33.5 pg    MCHC 33.8 28.4 - 34.8 g/dL    RDW 11.8 11.8 - 14.4 %    Platelets 842 422 - 568 k/uL    MPV 10.1 8.1 - 13.5 fL    NRBC Automated 0.0 0.0 per 100 WBC   Basic Metabolic Panel   Result Value Ref Range    Glucose 184 (H) 70 - 99 mg/dL    BUN 20 8 - 23 mg/dL    Creatinine 1.00 (H) 0.50 - 0.90 mg/dL    Bun/Cre Ratio 20 9 - 20    Calcium 9.7 8.6 - 10.4 mg/dL    Sodium 138 135 - 144 mmol/L    Potassium 3.9 3.7 - 5.3 mmol/L    Chloride 100 98 - 107 mmol/L    CO2 27 20 - 31 mmol/L    Anion Gap 11 9 - 17 mmol/L    GFR Non-African American 55 (L) >60 mL/min    GFR African American >60 >60 mL/min    GFR Comment          GFR Staging         Troponin   Result Value Ref Range    Troponin, High Sensitivity 10 0 - 14 ng/L   Urinalysis with Reflex to Culture    Specimen: Urine   Result Value Ref Range    Color, UA Yellow Yellow    Turbidity UA Clear Clear    Glucose, Ur NEGATIVE NEGATIVE    Bilirubin Urine NEGATIVE NEGATIVE    Ketones, Urine NEGATIVE NEGATIVE    Specific [place], UA 1.010 1.010 - 1.020    Urine Hgb NEGATIVE NEGATIVE    pH, UA 7.5 5.0 - 9.0    Protein, UA NEGATIVE NEGATIVE    Urobilinogen, Urine Normal Normal    Nitrite, Urine NEGATIVE NEGATIVE    Leukocyte Esterase, Urine TRACE (A) NEGATIVE   Microscopic Urinalysis   Result Value Ref Range    -          WBC, UA 0 TO 2 0 - 5 /HPF    RBC, UA 0 TO 2 0 - 2 /HPF    Epithelial Cells UA 0 TO 2 0 - 25 /HPF   Troponin   Result Value Ref Range    Troponin, High Sensitivity 9 0 - 14 ng/L   Brain Natriuretic Peptide   Result Value Ref Range    Pro-BNP 58 <300 pg/mL   EKG 12 Lead   Result Value Ref Range    Ventricular Rate 86 BPM    Atrial Rate 86 BPM    P-R Interval 170 ms    QRS Duration 80 ms    Q-T Interval 344 ms    QTc Calculation (Bazett) 411 ms    P Axis 60 degrees    R Axis -6 degrees    T Axis 57 degrees       IMPRESSION: Fatigue, hypertension    RADIOLOGY:  No orders to display         EKG:  EKG shows normal sinus rhythm, Q waves in the inferior leads with inferior infarct of age undetermined and Q waves in the anterior septal leads with anterior septal infarct of age undetermined. No ST elevations or depressions noted. Appears similar to EKG noted from March 1, 2017. EMERGENCY DEPARTMENT COURSE:  Reviewed results with elevated TSH, patient still persistently hypertensive. Low-dose of hydrochlorothiazide was given. But patient to trend blood pressure at home. And follow-up with her PCP. Discussed at length with patient as well as  who are in agreement. Return precautions reviewed. FINAL IMPRESSION      1.  Essential hypertension          DISPOSITION / PLAN     DISPOSITION Decision To Discharge 07/24/2022 04:38:29 AM      PATIENT REFERRED TO:  [phone]  [phone]    Schedule an appointment as soon as possible for a visit in 2 days      DISCHARGE MEDICATIONS:  New Prescriptions    No medications on file       Dali Williamson DO  4:42 AM    Attending Emergency Physician  [address] [place] [address] ED    (Please note that portions of this note were completed with a voice recognition program.  Effortswere made to edit the dictations but occasionally words are mis-transcribed.)              Mariah Rocha DO  07/24/22 [id number]

## 2022-07-24 NOTE — DISCHARGE INSTRUCTIONS
Continue to take your home medication of losartan as well as your diabetic medications. Follow-up with your primary care doctor in 2 days. Continue to record your home blood pressure readings morning and night. Return to the ER for worsening symptoms.

## 2022-08-06 ENCOUNTER — HOSPITAL ENCOUNTER (EMERGENCY)
Age: 72
Discharge: HOME | End: 2022-08-06
Attending: EMERGENCY MEDICINE
Payer: MEDICARE

## 2022-08-06 ENCOUNTER — APPOINTMENT (OUTPATIENT)
Dept: GENERAL RADIOLOGY | Age: 72
End: 2022-08-06
Payer: MEDICARE

## 2022-08-06 VITALS
DIASTOLIC BLOOD PRESSURE: 60 MMHG | OXYGEN SATURATION: 100 % | RESPIRATION RATE: 15 BRPM | WEIGHT: 130 LBS | TEMPERATURE: 97.3 F | HEART RATE: 78 BPM | SYSTOLIC BLOOD PRESSURE: 154 MMHG | BODY MASS INDEX: 22.2 KG/M2 | HEIGHT: 64 IN

## 2022-08-06 DIAGNOSIS — E86.0 DEHYDRATION: ICD-10-CM

## 2022-08-06 DIAGNOSIS — R73.9 HYPERGLYCEMIA: Primary | ICD-10-CM

## 2022-08-06 LAB
ALBUMIN SERPL-MCNC: 4.5 G/DL (ref 3.5–5.2)
ALBUMIN/GLOBULIN RATIO: 2 (ref 1–2.5)
ALP BLD-CCNC: 76 U/L (ref 35–104)
ALT SERPL-CCNC: 25 U/L (ref 5–33)
ANION GAP SERPL CALCULATED.3IONS-SCNC: 10 MMOL/L (ref 9–17)
AST SERPL-CCNC: 23 U/L
BILIRUB SERPL-MCNC: 0.4 MG/DL (ref 0.3–1.2)
BILIRUBIN URINE: NEGATIVE
BUN BLDV-MCNC: 25 MG/DL (ref 8–23)
BUN/CREAT BLD: 23 (ref 9–20)
CALCIUM SERPL-MCNC: 10.4 MG/DL (ref 8.6–10.4)
CHLORIDE BLD-SCNC: 92 MMOL/L (ref 98–107)
CO2: 32 MMOL/L (ref 20–31)
COLOR: YELLOW
CREAT SERPL-MCNC: 1.07 MG/DL (ref 0.5–0.9)
EKG ATRIAL RATE: 68 BPM
EKG P AXIS: 65 DEGREES
EKG P-R INTERVAL: 188 MS
EKG Q-T INTERVAL: 404 MS
EKG QRS DURATION: 78 MS
EKG QTC CALCULATION (BAZETT): 429 MS
EKG R AXIS: -4 DEGREES
EKG T AXIS: 41 DEGREES
EKG VENTRICULAR RATE: 68 BPM
EPITHELIAL CELLS UA: NORMAL /HPF (ref 0–25)
GFR AFRICAN AMERICAN: >60 ML/MIN
GFR NON-AFRICAN AMERICAN: 51 ML/MIN
GFR SERPL CREATININE-BSD FRML MDRD: ABNORMAL ML/MIN/{1.73_M2}
GFR SERPL CREATININE-BSD FRML MDRD: ABNORMAL ML/MIN/{1.73_M2}
GLUCOSE BLD-MCNC: 253 MG/DL (ref 70–99)
GLUCOSE URINE: ABNORMAL
HCT VFR BLD CALC: 49.2 % (ref 36.3–47.1)
HEMOGLOBIN: 16.4 G/DL (ref 11.9–15.1)
KETONES, URINE: NEGATIVE
LEUKOCYTE ESTERASE, URINE: NEGATIVE
MCH RBC QN AUTO: 30.9 PG (ref 25.2–33.5)
MCHC RBC AUTO-ENTMCNC: 33.3 G/DL (ref 28.4–34.8)
MCV RBC AUTO: 92.8 FL (ref 82.6–102.9)
NITRITE, URINE: NEGATIVE
NRBC AUTOMATED: 0 PER 100 WBC
PDW BLD-RTO: 12 % (ref 11.8–14.4)
PH UA: 7 (ref 5–9)
PLATELET # BLD: 302 K/UL (ref 138–453)
PMV BLD AUTO: 10 FL (ref 8.1–13.5)
POTASSIUM SERPL-SCNC: 4.6 MMOL/L (ref 3.7–5.3)
PROTEIN UA: NEGATIVE
RBC # BLD: 5.3 M/UL (ref 3.95–5.11)
RBC UA: NORMAL /HPF (ref 0–2)
SODIUM BLD-SCNC: 134 MMOL/L (ref 135–144)
SPECIFIC GRAVITY UA: 1.01 (ref 1.01–1.02)
THYROXINE, FREE: 1.84 NG/DL (ref 0.93–1.7)
TOTAL PROTEIN: 6.8 G/DL (ref 6.4–8.3)
TROPONIN, HIGH SENSITIVITY: 11 NG/L (ref 0–14)
TROPONIN, HIGH SENSITIVITY: 12 NG/L (ref 0–14)
TSH SERPL DL<=0.05 MIU/L-ACNC: 47.05 UIU/ML (ref 0.3–5)
TURBIDITY: CLEAR
URINE HGB: NEGATIVE
UROBILINOGEN, URINE: NORMAL
WBC # BLD: 21.3 K/UL (ref 3.5–11.3)
WBC UA: NORMAL /HPF (ref 0–5)

## 2022-08-06 PROCEDURE — 99285 EMERGENCY DEPT VISIT HI MDM: CPT

## 2022-08-06 PROCEDURE — 2580000003 HC RX 258: Performed by: EMERGENCY MEDICINE

## 2022-08-06 PROCEDURE — 81001 URINALYSIS AUTO W/SCOPE: CPT

## 2022-08-06 PROCEDURE — 80053 COMPREHEN METABOLIC PANEL: CPT

## 2022-08-06 PROCEDURE — 96360 HYDRATION IV INFUSION INIT: CPT

## 2022-08-06 PROCEDURE — 71045 X-RAY EXAM CHEST 1 VIEW: CPT

## 2022-08-06 PROCEDURE — 84443 ASSAY THYROID STIM HORMONE: CPT

## 2022-08-06 PROCEDURE — 84439 ASSAY OF FREE THYROXINE: CPT

## 2022-08-06 PROCEDURE — 85027 COMPLETE CBC AUTOMATED: CPT

## 2022-08-06 PROCEDURE — 93010 ELECTROCARDIOGRAM REPORT: CPT | Performed by: INTERNAL MEDICINE

## 2022-08-06 PROCEDURE — 84484 ASSAY OF TROPONIN QUANT: CPT

## 2022-08-06 PROCEDURE — 93005 ELECTROCARDIOGRAM TRACING: CPT | Performed by: EMERGENCY MEDICINE

## 2022-08-06 RX ORDER — 0.9 % SODIUM CHLORIDE 0.9 %
1500 INTRAVENOUS SOLUTION INTRAVENOUS ONCE
Status: COMPLETED | OUTPATIENT
Start: 2022-08-06 | End: 2022-08-06

## 2022-08-06 RX ORDER — 0.9 % SODIUM CHLORIDE 0.9 %
1000 INTRAVENOUS SOLUTION INTRAVENOUS ONCE
Status: COMPLETED | OUTPATIENT
Start: 2022-08-06 | End: 2022-08-06

## 2022-08-06 RX ADMIN — SODIUM CHLORIDE 1000 ML: 9 INJECTION, SOLUTION INTRAVENOUS at 04:00

## 2022-08-06 RX ADMIN — SODIUM CHLORIDE 1500 ML: 9 INJECTION, SOLUTION INTRAVENOUS at 03:00

## 2022-08-06 ASSESSMENT — PAIN - FUNCTIONAL ASSESSMENT: PAIN_FUNCTIONAL_ASSESSMENT: NONE - DENIES PAIN

## 2022-08-06 NOTE — DISCHARGE INSTRUCTIONS
Please follow-up with your primary care physician for possible medication adjustment. Please watch your carbohydrate intake. If symptoms worsen please return to emergency department. Continue tostay hydrated.

## 2022-08-06 NOTE — ED PROVIDER NOTES
[facility]  EMERGENCY DEPARTMENT ENCOUNTER      Pt Name: Mariah Thayer  MRN: [id number]  [place] 1950  Date of evaluation: 8/6/2022  Provider: Tony Avial MD    CHIEF COMPLAINT       Chief Complaint   Patient presents with    Loss of Consciousness     X 2 PTA, recent bp med changes         HISTORY OF PRESENT ILLNESS   (Location/Symptom, Timing/Onset, Context/Setting, Quality, Duration, Modifying Factors, Severity)  Note limiting factors. Mariah Thayer is a 71 y.o. female who presents to the emergency department concern for syncope x2. Patient has a history of remote diabetes recently started on Januvia. Also patient states her blood pressure medication was adjusted recently. Denies any chest pain, urinary symptoms or shortness of breath. States she did not have any chest pain or other symptoms for passing out but she did when she stood up. Denies any other acute complaints. HPI    Nursing Notes were reviewed. REVIEW OF SYSTEMS    (2-9 systems for level 4, 10 or more for level 5)     Review of Systems   All other systems reviewed and are negative. Except as noted above the remainder of the review of systems was reviewed and negative.        PAST MEDICAL HISTORY     Past Medical History:   Diagnosis Date    Chronic kidney disease, stage 3 ([address]     Depression     Diabetes mellitus ([address]     Endometriosis     History of breast cancer 02/01/2017    well differentiated, invasive ductal carcinoma     Hyperlipidemia     Hypertension     Hypothyroidism          SURGICAL HISTORY       Past Surgical History:   Procedure Laterality Date    APPENDECTOMY  1993    BREAST LUMPECTOMY Left 03/08/2017    Dr. Jaquan Ruby - sentinel node with lumpectomy    CARDIAC CATHETERIZATION  2005    Dr. Alfredo Russell - normal coronaries    [facility]  2011    Dr. Jodi Salinas - no polyps    HYSTERECTOMY, VAGINAL  1993    with unilateral oophorectomy TONSILLECTOMY  1961         CURRENT MEDICATIONS       Previous Medications    ANASTROZOLE (ARIMIDEX) 1 MG TABLET    Take 1 tablet by mouth once daily    ASPIRIN 81 MG TABLET    Take 81 mg by mouth daily. ATORVASTATIN (LIPITOR) 20 MG TABLET    Take 1 tablet by mouth once daily    FREESTYLE LITE STRIP    USE 1 STRIP TO CHECK GLUCOSE ONCE DAILY AS DIRECTED    LEVOTHYROXINE (SYNTHROID) 150 MCG TABLET    Take 1 tablet by mouth in the morning. LOSARTAN (COZAAR) 100 MG TABLET    Take 1 tablet by mouth in the morning. METOPROLOL SUCCINATE (TOPROL XL) 25 MG EXTENDED RELEASE TABLET    Take 1 tablet by mouth in the morning. MULTIPLE VITAMINS-MINERALS (MULTIVITAMIN ADULT PO)    Take by mouth daily    SITAGLIPTIN (JANUVIA) 100 MG TABLET    Take 1 tablet by mouth in the morning.     VENLAFAXINE (EFFEXOR) 50 MG TABLET    Take 25 mg by mouth 3 times daily       ALLERGIES     Tape [adhesive tape], Daypro [oxaprozin], Pcn [penicillins], Prednisone, Prilosec [omeprazole], Strawberry extract, Clindamycin/lincomycin, and Vancomycin    FAMILY HISTORY       Family History   Problem Relation Age of Onset    Cancer Mother         bone    Heart Attack Father     Heart Disease Father     Asthma Brother     No Known Problems Sister     No Known Problems Sister           SOCIAL HISTORY       Social History     Socioeconomic History    Marital status:    Tobacco Use    Smoking status: Never    Smokeless tobacco: Never   Substance and Sexual Activity    Alcohol use: No    Drug use: No     Social Determinants of Health     Financial Resource Strain: Low Risk     Difficulty of Paying Living Expenses: Not hard at all   Food Insecurity: No Food Insecurity    Worried About Running Out of Food in the Last Year: Never true    Ran Out of Food in the Last Year: Never true       SCREENINGS                               WA Assessment  BP: (!) 154/60  Heart Rate: 78                 PHYSICAL EXAM    (up to 7 for level 4, 8 or more for

## 2022-09-08 ENCOUNTER — OFFICE VISIT (OUTPATIENT)
Dept: ONCOLOGY | Age: 72
End: 2022-09-08
Payer: MEDICARE

## 2022-09-08 VITALS
HEIGHT: 64 IN | DIASTOLIC BLOOD PRESSURE: 88 MMHG | TEMPERATURE: 97.3 F | BODY MASS INDEX: 23.56 KG/M2 | HEART RATE: 89 BPM | RESPIRATION RATE: 18 BRPM | SYSTOLIC BLOOD PRESSURE: 158 MMHG | WEIGHT: 138 LBS

## 2022-09-08 DIAGNOSIS — Z17.0 MALIGNANT NEOPLASM OF UPPER-OUTER QUADRANT OF LEFT BREAST IN FEMALE, ESTROGEN RECEPTOR POSITIVE (HCC): Primary | ICD-10-CM

## 2022-09-08 DIAGNOSIS — C50.412 MALIGNANT NEOPLASM OF UPPER-OUTER QUADRANT OF LEFT BREAST IN FEMALE, ESTROGEN RECEPTOR POSITIVE (HCC): Primary | ICD-10-CM

## 2022-09-08 DIAGNOSIS — Z12.31 SCREENING MAMMOGRAM FOR HIGH-RISK PATIENT: ICD-10-CM

## 2022-09-08 PROCEDURE — 99211 OFF/OP EST MAY X REQ PHY/QHP: CPT | Performed by: INTERNAL MEDICINE

## 2022-09-08 PROCEDURE — 99214 OFFICE O/P EST MOD 30 MIN: CPT | Performed by: INTERNAL MEDICINE

## 2022-09-08 PROCEDURE — G8427 DOCREV CUR MEDS BY ELIG CLIN: HCPCS | Performed by: INTERNAL MEDICINE

## 2022-09-08 PROCEDURE — 1123F ACP DISCUSS/DSCN MKR DOCD: CPT | Performed by: INTERNAL MEDICINE

## 2022-09-08 PROCEDURE — 3017F COLORECTAL CA SCREEN DOC REV: CPT | Performed by: INTERNAL MEDICINE

## 2022-09-08 PROCEDURE — G8399 PT W/DXA RESULTS DOCUMENT: HCPCS | Performed by: INTERNAL MEDICINE

## 2022-09-08 PROCEDURE — 1090F PRES/ABSN URINE INCON ASSESS: CPT | Performed by: INTERNAL MEDICINE

## 2022-09-08 PROCEDURE — G8420 CALC BMI NORM PARAMETERS: HCPCS | Performed by: INTERNAL MEDICINE

## 2022-09-08 PROCEDURE — 1036F TOBACCO NON-USER: CPT | Performed by: INTERNAL MEDICINE

## 2022-09-08 RX ORDER — ANASTROZOLE 1 MG/1
TABLET ORAL
Qty: 30 TABLET | Refills: 3 | Status: SHIPPED | OUTPATIENT
Start: 2022-09-08

## 2022-09-08 NOTE — PROGRESS NOTES
Chief Complaint   Patient presents with    Follow-up     Breast cancer     DIAGNOSIS:        Stage TIa invasive ductal carcinoma of the left breast with areas of DCIS, ER positive, VA negative. Status post lumpectomy with close surgical margins. CURRENT THERAPY:         Left breast lumpectomy and sentinel lymph node biopsy on  March 8, 2017. Radiation therapy completed 5/10/17  Arimidex started 5/15/17    BRIEF CASE HISTORY:      Mikaela Garrett is a very pleasant 71 y.o. female who is referred to us for management recommendation regarding her breast cancer. She did not have a mammogram since 2011. This year and under the insistence of Dr. Gisella Ventura, she went for bilateral screening mammogram.  There was some density in both breasts but there was some calcification that is new in the retroareolar aspect of the left breast.  Diagnostic mammogram was done on  1/17/17 And showed scattered calcification at the 2:00 position of the left breast.  Image guided biopsy was done on 2/1/17 and showed invasive ductal carcinoma grade 1 measuring 2 mm,  ER positive VA negative and HER-2/fracisco could not be done because of the sample size. She had left breast lumpectomy and sentinel lymph node biopsy on March 8, 2017. INTERIM HISTORY:   The patient comes in today for a follow-up  She continues to be doing well. She is tolerating Arimidex well with minimal hot flashes. No nausea or vomiting no fever or chills or other side effects. No bone pain, no nausea or vomiting. She was due for mammogram on June 2022 was not done             PAST MEDICAL HISTORY: has a past medical history of Chronic kidney disease, stage 3 ([address] Depression, Diabetes mellitus ([address] Endometriosis, History of breast cancer, Hyperlipidemia, Hypertension, and Hypothyroidism. PAST SURGICAL HISTORY: has a past surgical history that includes Appendectomy (1993); Tonsillectomy (1961); Colonoscopy (2011);  Breast lumpectomy (Left, 03/08/2017); Hysterectomy, vaginal (1993); Cholecystectomy, laparoscopic (1994); and Cardiac catheterization (2005). CURRENT MEDICATIONS:  has a current medication list which includes the following prescription(s): levothyroxine, metoprolol succinate, losartan, sitagliptin, atorvastatin, anastrozole, freestyle lite, venlafaxine, aspirin, freestyle lite, and multiple vitamin. ALLERGIES:  is allergic to tape [adhesive tape], daypro [oxaprozin], pcn [penicillins], prednisone, prilosec [omeprazole], strawberry extract, amlodipine, clindamycin/lincomycin, and vancomycin. FAMILY HISTORY: Negative for any hematological or oncological conditions. SOCIAL HISTORY:  reports that she has never smoked. She has never used smokeless tobacco. She reports that she does not drink alcohol and does not use drugs. REVIEW OF SYSTEMS:   General: no fever or night sweats, Weight is stable. ENT: No double or blurred vision, no tinnitus or hearing problem, no dysphagia or sore throat   Respiratory: No chest pain, no shortness of breath, no cough or hemoptysis. Cardiovascular: Denies chest pain, PND or orthopnea. No L E swelling or palpitations. Gastrointestinal:    No nausea or vomiting, abdominal pain, diarrhea or constipation. Genitourinary: Denies dysuria, hematuria, frequency, urgency or incontinence. Neurological: Denies headaches, decreased LOC, no sensory or motor focal deficits. Musculoskeletal:  No arthralgia no back pain or joint swelling. Skin: There are no rashes or bleeding. Psychiatric:   she has history of psychiatric illness but lately things have been controlled. Endocrine: no diabetes or thyroid disease. Hematologic: no bleeding , no adenopathy. PHYSICAL EXAM: Shows a well appearing 71y.o.-year-old female who is not in pain or distress. Vital Signs: Blood pressure (!) 158/88, pulse 89, temperature 97.3 °F (36.3 °C), temperature source Temporal, resp.  rate 18, height 5' 4\" (1.626 m), weight 138 lb (62.6 kg), not currently breastfeeding. HEENT: Normocephalic and atraumatic. Pupils are equal, round, reactive to light and accommodation. Extraocular muscles are intact. Neck: Showed no JVD, no carotid bruit . Lungs: Clear to auscultation bilaterally. Heart: Regular without any murmur. Abdomen: Soft, nontender. No hepatosplenomegaly. Extremities: Lower extremities show no edema, clubbing, or cyanosis. Breasts: Left-sided status post lumpectomy and sentinel lymph node biopsy. No adenopathy was appreciated. As discussed above, there is a small 7 to 10 mm hard area inferior to her incision between the incision and areola, attached to skin. Right breast is free of any masses or adenopathy. Neuro exam: intact cranial nerves bilaterally no motor or sensory deficit, gait is normal. Lymphatic: no adenopathy appreciated in the supraclavicular, axillary, cervical or inguinal area    Review of radiological studies:   Mamm 6/2020  Impression    Stable post treatment changes in the left breast.  No evidence of malignancy. Advise annual screening mammography. BI-RADS 2         BIRADS:    BIRADS - CATEGORY 2      Diagnostic mamm 12/2020  Impression   No evidence for malignancy. BIRADS:   BIRADS - CATEGORY 2       Benign, no evidence of malignancy. Patient will be due for screening   mammogram in 6 months. OVERALL ASSESSMENT - BENIGN        IMPRESSION:   Stage TIa invasive ductal carcinoma of the left breast with areas of DCIS, ER positive, AL negative. Status post lumpectomy and radiation.   Good performance status and minimal comorbidities  Adjuvant Arimidex which was started back in May 2017  Woman bone density scan on March 2022 in the normal limit    Plan:   The patient continues to do very well  No evidence of recurrent disease, mammogram was reviewed, no evidence of recurrent  Examination today showed stable examination with a scar in the left breast.    No evidence of metastatic disease or recurrent disease  Will obtain annual screening mammogram which was not due to June 2022 and if this is negative will discontinue Arimidex as she finished 5 years of adjuvant therapy with low risk cancer      RTC after the mammogram                                    [facility]                            This note is created with the assistance of a speech recognition program.  While intending to generate a document that actually reflects the content of the visit, the document can still have some errors including those of syntax and sound a like substitutions which may escape proof reading. It such instances, actual meaning can be extrapolated by contextual diversion.

## 2022-11-23 NOTE — DISCHARGE SUMMARY
Phone: [facility]          Fax: [phone]                            Outpatient Physical Therapy                                                                    Discharge Summary    Patient: Madhu Lawrence  : 1950  [facility] #: [id number]   Referring Physician: Nitin Graff MD   Diagnosis: Lumbar foraminal stenosis, M48.061  Treatment Diagnosis: Low back pain; L proximal HS strain      Date Treatment Initiated: 22  Date of Last Treatment: 22      PT Visit Information  PT Insurance Information: Medicare  Total # of Visits Approved: 12  Total # of Visits to Date: 9  Plan of Care/Certification Expiration Date: 22  No Show: 0  Canceled Appointment: 0  Referring Provider (secondary): Dr. Paz Jett MD      Frequency/Duration  Plan Frequency: 2 times per week  Plan weeks: 4-6 weeks      Treatment Received  [x] HP/CP      [x] Electrical Stim   [x] Therapeutic Exercise      [x] Gait Training  [] Aquatics   [] Ultrasound         [x] Patient Education/HEP   [x] Manual Therapy  [] Traction    [x] Neuro-adi        [x] Soft Tissue Mobs            [] Home TENS  [] Iontophoresis    [] Orthotic casting/fitting      [x] Dry Needling    Assessment  Assessment: Patient attended 9 PT visits for low back pain and L HS strain and met all goals for independence with HEP and improved ROM and strength and for decreased pain. Will dc patient at this time d/t meeting all goals. Goals  Short Term Goals  Time Frame for Short Term Goals: 3 weeks  Short Term Goal 1: Patient to initiate HEP for improved core strength, lumbar stability and L HS flexibility. -MET  Short Term Goal 2: Patient to be instructed in core strengthening to improve lumbar stability and decrease radicular pain.-met  Short Term Goal 3: Initiate manual techniques/modalities prn to decrease L proximal HS pain and improve mobility.  -MET    Long Term Goals  Time Frame for Long Term Goals : 6 weeks  Long Term Goal 1: Patient to be independent and compliant with HEP. Long Term Goal 2: Patient to have improved core and B hip strength >/=4+/5 grossly for improved lumbar stability. -met  Long Term Goal 3: Patient to have improve lumbar AROM flexion: 4 in from floor with no increase in pain and improved L HS mobility with TKE with HS 90/90 test to improve functional mobility. -met  Long Term Goal 4: Patient to report >/=75% improvement in symptoms for improved QOL.       Reason for Discharge  [x] Goals Achieved                        []  Poor Follow Through/Attendance                  [x]  Optimal Function Achieved     []  Patient Discharged Self    []  Hospitalization                         []  Physician discharge      Thank you for this referral      Candis Flores PT, DPT               Date: 11/23/2022

## 2022-12-22 RX ORDER — ANASTROZOLE 1 MG/1
TABLET ORAL
Qty: 30 TABLET | Refills: 2 | Status: SHIPPED | OUTPATIENT
Start: 2022-12-22

## 2023-02-06 ENCOUNTER — HOSPITAL ENCOUNTER (OUTPATIENT)
Dept: WOMENS IMAGING | Age: 73
Discharge: HOME | End: 2023-02-08
Payer: MEDICARE

## 2023-02-06 DIAGNOSIS — C50.412 MALIGNANT NEOPLASM OF UPPER-OUTER QUADRANT OF LEFT BREAST IN FEMALE, ESTROGEN RECEPTOR POSITIVE (HCC): ICD-10-CM

## 2023-02-06 DIAGNOSIS — Z12.31 SCREENING MAMMOGRAM FOR HIGH-RISK PATIENT: ICD-10-CM

## 2023-02-06 DIAGNOSIS — Z17.0 MALIGNANT NEOPLASM OF UPPER-OUTER QUADRANT OF LEFT BREAST IN FEMALE, ESTROGEN RECEPTOR POSITIVE (HCC): ICD-10-CM

## 2023-02-06 PROCEDURE — 77063 BREAST TOMOSYNTHESIS BI: CPT

## 2023-02-07 DIAGNOSIS — C50.412 MALIGNANT NEOPLASM OF UPPER-OUTER QUADRANT OF LEFT BREAST IN FEMALE, ESTROGEN RECEPTOR POSITIVE (HCC): Primary | ICD-10-CM

## 2023-02-07 DIAGNOSIS — R92.8 ABNORMAL SCREENING MAMMOGRAM: ICD-10-CM

## 2023-02-07 DIAGNOSIS — Z17.0 MALIGNANT NEOPLASM OF UPPER-OUTER QUADRANT OF LEFT BREAST IN FEMALE, ESTROGEN RECEPTOR POSITIVE (HCC): Primary | ICD-10-CM

## 2023-02-07 RX ORDER — ANASTROZOLE 1 MG/1
TABLET ORAL
Qty: 30 TABLET | Refills: 1 | Status: SHIPPED | OUTPATIENT
Start: 2023-02-07

## 2023-02-07 NOTE — TELEPHONE ENCOUNTER
Called patient to inform of diagnostic mammography that has been ordered and follow-up with Dr. Jo-Ann Tenorio delayed until after additional breast imaging. Patient states she will need refill of Anastrozole in the meantime. Send to [facility].

## 2023-02-24 ENCOUNTER — APPOINTMENT (OUTPATIENT)
Dept: ULTRASOUND IMAGING | Age: 73
End: 2023-02-24
Payer: MEDICARE

## 2023-02-24 ENCOUNTER — HOSPITAL ENCOUNTER (OUTPATIENT)
Dept: WOMENS IMAGING | Age: 73
End: 2023-02-24
Payer: MEDICARE

## 2023-02-24 DIAGNOSIS — Z17.0 MALIGNANT NEOPLASM OF UPPER-OUTER QUADRANT OF LEFT BREAST IN FEMALE, ESTROGEN RECEPTOR POSITIVE (HCC): ICD-10-CM

## 2023-02-24 DIAGNOSIS — C50.412 MALIGNANT NEOPLASM OF UPPER-OUTER QUADRANT OF LEFT BREAST IN FEMALE, ESTROGEN RECEPTOR POSITIVE (HCC): ICD-10-CM

## 2023-02-24 DIAGNOSIS — R92.8 ABNORMAL SCREENING MAMMOGRAM: ICD-10-CM

## 2023-02-24 PROCEDURE — G0279 TOMOSYNTHESIS, MAMMO: HCPCS

## 2023-03-02 ENCOUNTER — OFFICE VISIT (OUTPATIENT)
Dept: ONCOLOGY | Age: 73
End: 2023-03-02
Payer: MEDICARE

## 2023-03-02 VITALS
TEMPERATURE: 97.2 F | WEIGHT: 141 LBS | DIASTOLIC BLOOD PRESSURE: 70 MMHG | RESPIRATION RATE: 18 BRPM | SYSTOLIC BLOOD PRESSURE: 142 MMHG | HEART RATE: 73 BPM | BODY MASS INDEX: 24.2 KG/M2

## 2023-03-02 DIAGNOSIS — Z17.0 MALIGNANT NEOPLASM OF UPPER-OUTER QUADRANT OF LEFT BREAST IN FEMALE, ESTROGEN RECEPTOR POSITIVE (HCC): Primary | ICD-10-CM

## 2023-03-02 DIAGNOSIS — C50.412 MALIGNANT NEOPLASM OF UPPER-OUTER QUADRANT OF LEFT BREAST IN FEMALE, ESTROGEN RECEPTOR POSITIVE (HCC): Primary | ICD-10-CM

## 2023-03-02 PROCEDURE — G8427 DOCREV CUR MEDS BY ELIG CLIN: HCPCS | Performed by: INTERNAL MEDICINE

## 2023-03-02 PROCEDURE — G8484 FLU IMMUNIZE NO ADMIN: HCPCS | Performed by: INTERNAL MEDICINE

## 2023-03-02 PROCEDURE — 1090F PRES/ABSN URINE INCON ASSESS: CPT | Performed by: INTERNAL MEDICINE

## 2023-03-02 PROCEDURE — G8399 PT W/DXA RESULTS DOCUMENT: HCPCS | Performed by: INTERNAL MEDICINE

## 2023-03-02 PROCEDURE — 3017F COLORECTAL CA SCREEN DOC REV: CPT | Performed by: INTERNAL MEDICINE

## 2023-03-02 PROCEDURE — 3078F DIAST BP <80 MM HG: CPT | Performed by: INTERNAL MEDICINE

## 2023-03-02 PROCEDURE — 1123F ACP DISCUSS/DSCN MKR DOCD: CPT | Performed by: INTERNAL MEDICINE

## 2023-03-02 PROCEDURE — 99214 OFFICE O/P EST MOD 30 MIN: CPT | Performed by: INTERNAL MEDICINE

## 2023-03-02 PROCEDURE — G8420 CALC BMI NORM PARAMETERS: HCPCS | Performed by: INTERNAL MEDICINE

## 2023-03-02 PROCEDURE — 3077F SYST BP >= 140 MM HG: CPT | Performed by: INTERNAL MEDICINE

## 2023-03-02 PROCEDURE — 1036F TOBACCO NON-USER: CPT | Performed by: INTERNAL MEDICINE

## 2023-03-02 NOTE — PROGRESS NOTES
Chief Complaint   Patient presents with    Follow-up     Malignant neoplasm of upper-outer quadrant of left breast female in estrogen receptor positive      DIAGNOSIS:        Stage TIa invasive ductal carcinoma of the left breast with areas of DCIS, ER positive, TX negative. Status post lumpectomy with close surgical margins. CURRENT THERAPY:         Left breast lumpectomy and sentinel lymph node biopsy on  March 8, 2017. Radiation therapy completed 5/10/17  Arimidex started 5/15/17    BRIEF CASE HISTORY:      Papi Dalton is a very pleasant 71 y.o. female who is referred to us for management recommendation regarding her breast cancer. She did not have a mammogram since 2011. This year and under the insistence of Dr. Lacho Gasca, she went for bilateral screening mammogram.  There was some density in both breasts but there was some calcification that is new in the retroareolar aspect of the left breast.  Diagnostic mammogram was done on  1/17/17 And showed scattered calcification at the 2:00 position of the left breast.  Image guided biopsy was done on 2/1/17 and showed invasive ductal carcinoma grade 1 measuring 2 mm,  ER positive TX negative and HER-2/fracisco could not be done because of the sample size. She had left breast lumpectomy and sentinel lymph node biopsy on March 8, 2017. INTERIM HISTORY:   The patient comes in today for a follow-up  She continues to be doing well. She is tolerating Arimidex well with minimal hot flashes. No nausea or vomiting no fever or chills or other side effects. No bone pain, no nausea or vomiting. Most recent mammogram on February 2023 and a normal limits             PAST MEDICAL HISTORY: has a past medical history of Chronic kidney disease, stage 3 ([address] Depression, Diabetes mellitus ([address] Endometriosis, History of breast cancer, Hyperlipidemia, Hypertension, and Hypothyroidism.     PAST SURGICAL HISTORY: has a past surgical history that includes Appendectomy (1993); Tonsillectomy (1961); Colonoscopy (2011); Breast lumpectomy (Left, 03/08/2017); Hysterectomy, vaginal (1993); Cholecystectomy, laparoscopic (1994); Cardiac catheterization (2005); Colonoscopy (01/04/2023); and Hysterectomy. CURRENT MEDICATIONS:  has a current medication list which includes the following prescription(s): anastrozole, metoprolol succinate, freestyle lite, levothyroxine, freestyle lite, losartan, sitagliptin, atorvastatin, venlafaxine, multiple vitamin, and aspirin. ALLERGIES:  is allergic to tape [adhesive tape], daypro [oxaprozin], pcn [penicillins], prednisone, prilosec [omeprazole], strawberry extract, amlodipine, clindamycin/lincomycin, and vancomycin. FAMILY HISTORY: Negative for any hematological or oncological conditions. SOCIAL HISTORY:  reports that she has never smoked. She has never used smokeless tobacco. She reports that she does not drink alcohol and does not use drugs. REVIEW OF SYSTEMS:   General: no fever or night sweats, Weight is stable. ENT: No double or blurred vision, no tinnitus or hearing problem, no dysphagia or sore throat   Respiratory: No chest pain, no shortness of breath, no cough or hemoptysis. Cardiovascular: Denies chest pain, PND or orthopnea. No L E swelling or palpitations. Gastrointestinal:    No nausea or vomiting, abdominal pain, diarrhea or constipation. Genitourinary: Denies dysuria, hematuria, frequency, urgency or incontinence. Neurological: Denies headaches, decreased LOC, no sensory or motor focal deficits. Musculoskeletal:  No arthralgia no back pain or joint swelling. Skin: There are no rashes or bleeding. Psychiatric:   she has history of psychiatric illness but lately things have been controlled. Endocrine: no diabetes or thyroid disease. Hematologic: no bleeding , no adenopathy. PHYSICAL EXAM: Shows a well appearing 71y.o.-year-old female who is not in pain or distress.  Vital Signs: Blood pressure (!) 142/70, pulse 73, temperature 97.2 °F (36.2 °C), temperature source Temporal, resp. rate 18, weight 141 lb (64 kg), not currently breastfeeding. HEENT: Normocephalic and atraumatic. Pupils are equal, round, reactive to light and accommodation. Extraocular muscles are intact. Neck: Showed no JVD, no carotid bruit . Lungs: Clear to auscultation bilaterally. Heart: Regular without any murmur. Abdomen: Soft, nontender. No hepatosplenomegaly. Extremities: Lower extremities show no edema, clubbing, or cyanosis. Breasts: Left-sided status post lumpectomy and sentinel lymph node biopsy. No adenopathy was appreciated. As discussed above, there is a small 7 to 10 mm hard area inferior to her incision between the incision and areola, attached to skin. Right breast is free of any masses or adenopathy. Neuro exam: intact cranial nerves bilaterally no motor or sensory deficit, gait is normal. Lymphatic: no adenopathy appreciated in the supraclavicular, axillary, cervical or inguinal area    Review of radiological studies:   Mamm 6/2020  Impression    Stable post treatment changes in the left breast.  No evidence of malignancy. Advise annual screening mammography. BI-RADS 2         BIRADS:    BIRADS - CATEGORY 2      Diagnostic mamm 12/2020  Impression   No evidence for malignancy. BIRADS:   BIRADS - CATEGORY 2       Benign, no evidence of malignancy. Patient will be due for screening   mammogram in 6 months. OVERALL ASSESSMENT - BENIGN        IMPRESSION:   Stage TIa invasive ductal carcinoma of the left breast with areas of DCIS, ER positive, PA negative. Status post lumpectomy and radiation.   Good performance status and minimal comorbidities  Adjuvant Arimidex which was started back in May 2017  Woman bone density scan on March 2022 in the normal limit    Plan:   The patient continues to do very well  No evidence of recurrent disease, mammogram was reviewed, no evidence of recurrent  Examination today showed stable examination with a scar in the left breast.    No evidence of metastatic disease or recurrent disease  Most recent mammogram on February 2023 in normal limits> 12 months mammogram  Discussed with the patient about length of adjuvant hormonal treatment, we discussed as having very early breast cancer and low-grade and small size and she had almost 6 years of hormonal treatment will discontinue hormonal treatment and proceed with surveillance  Will obtain annual screening mammogram which was not due to June 2022 and if this is negative will discontinue Arimidex as she finished 5 years of adjuvant therapy with low risk cancer                                        [facility]                            This note is created with the assistance of a speech recognition program.  While intending to generate a document that actually reflects the content of the visit, the document can still have some errors including those of syntax and sound a like substitutions which may escape proof reading. It such instances, actual meaning can be extrapolated by contextual diversion.

## 2023-09-07 LAB
ABSOLUTE BASO #: 0.11 K/UL (ref 0–0.2)
ABSOLUTE EOS #: 1.02 K/UL (ref 0–0.5)
ABSOLUTE LYMPH #: 1.16 K/UL (ref 1–4)
ABSOLUTE MONO #: 0.53 K/UL (ref 0.2–1)
ABSOLUTE NEUT #: 2.52 K/UL (ref 1.5–7.5)
ALBUMIN SERPL-MCNC: 4.5 G/DL (ref 3.5–5.2)
ALK PHOSPHATASE: 93 U/L (ref 40–142)
ALT SERPL-CCNC: 58 U/L (ref 5–40)
ANION GAP SERPL CALCULATED.3IONS-SCNC: 11 MEQ/L (ref 7–16)
AST SERPL-CCNC: 37 U/L (ref 9–40)
BASOPHILS RELATIVE PERCENT: 2.1 %
BILIRUB SERPL-MCNC: 0.4 MG/DL
BUN BLDV-MCNC: 14 MG/DL (ref 8–23)
CALCIUM SERPL-MCNC: 9.9 MG/DL (ref 8.5–10.5)
CHLORIDE BLD-SCNC: 103 MEQ/L (ref 95–107)
CO2: 29 MEQ/L (ref 19–31)
CREAT SERPL-MCNC: 0.91 MG/DL (ref 0.6–1.3)
EGFR IF NONAFRICAN AMERICAN: 67 ML/MIN/1.73
EOSINOPHILS RELATIVE PERCENT: 19.1 %
GLUCOSE: 173 MG/DL (ref 70–99)
HCT VFR BLD CALC: 41.4 % (ref 34–45)
HEMOGLOBIN: 14.3 G/DL (ref 11.5–15.5)
LYMPHOCYTE %: 21.7 %
MCH RBC QN AUTO: 30.4 PG (ref 25–33)
MCHC RBC AUTO-ENTMCNC: 34.5 G/DL (ref 31–36)
MCV RBC AUTO: 87.9 FL (ref 80–99)
MONOCYTES # BLD: 9.9 %
NEUTROPHILS RELATIVE PERCENT: 47 %
PDW BLD-RTO: 11.6 % (ref 11.5–15)
PLATELETS: 201 K/UL (ref 130–400)
PMV BLD AUTO: 10 FL (ref 9.3–13)
POTASSIUM SERPL-SCNC: 4.9 MEQ/L (ref 3.5–5.4)
RBC: 4.71 M/UL (ref 3.8–5.4)
SODIUM BLD-SCNC: 143 MEQ/L (ref 133–146)
TOTAL PROTEIN: 6.5 G/DL (ref 6.1–8.3)
WBC: 5.4 K/UL (ref 3.5–11)

## 2023-09-14 ENCOUNTER — OFFICE VISIT (OUTPATIENT)
Dept: ONCOLOGY | Age: 73
End: 2023-09-14
Payer: MEDICARE

## 2023-09-14 VITALS
BODY MASS INDEX: 25.27 KG/M2 | SYSTOLIC BLOOD PRESSURE: 145 MMHG | WEIGHT: 148 LBS | HEART RATE: 76 BPM | TEMPERATURE: 97.4 F | DIASTOLIC BLOOD PRESSURE: 74 MMHG | HEIGHT: 64 IN | RESPIRATION RATE: 18 BRPM

## 2023-09-14 DIAGNOSIS — C50.412 MALIGNANT NEOPLASM OF UPPER-OUTER QUADRANT OF LEFT BREAST IN FEMALE, ESTROGEN RECEPTOR POSITIVE (HCC): Primary | ICD-10-CM

## 2023-09-14 DIAGNOSIS — R79.89 LFTS ABNORMAL: ICD-10-CM

## 2023-09-14 DIAGNOSIS — Z17.0 MALIGNANT NEOPLASM OF UPPER-OUTER QUADRANT OF LEFT BREAST IN FEMALE, ESTROGEN RECEPTOR POSITIVE (HCC): Primary | ICD-10-CM

## 2023-09-14 PROCEDURE — 99211 OFF/OP EST MAY X REQ PHY/QHP: CPT | Performed by: INTERNAL MEDICINE

## 2023-09-14 RX ORDER — GLIPIZIDE 5 MG/1
TABLET ORAL
COMMUNITY
Start: 2023-09-08

## 2023-09-14 NOTE — PROGRESS NOTES
Chief Complaint   Patient presents with    Follow-up     Malignant neoplasm of upper outer quadrant of left breast in female     DIAGNOSIS:        Stage TIa invasive ductal carcinoma of the left breast with areas of DCIS, ER positive, CT negative. Status post lumpectomy with close surgical margins. CURRENT THERAPY:         Left breast lumpectomy and sentinel lymph node biopsy on  March 8, 2017. Radiation therapy completed 5/10/17  Arimidex started 5/15/17>2/23    BRIEF CASE HISTORY:      Hao More is a very pleasant 68 y.o. female who is referred to us for management recommendation regarding her breast cancer. She did not have a mammogram since 2011. This year and under the insistence of Dr. Sukhdev Jeffers, she went for bilateral screening mammogram.  There was some density in both breasts but there was some calcification that is new in the retroareolar aspect of the left breast.  Diagnostic mammogram was done on  1/17/17 And showed scattered calcification at the 2:00 position of the left breast.  Image guided biopsy was done on 2/1/17 and showed invasive ductal carcinoma grade 1 measuring 2 mm,  ER positive CT negative and HER-2/fracisco could not be done because of the sample size. She had left breast lumpectomy and sentinel lymph node biopsy on March 8, 2017. INTERIM HISTORY:   The patient comes in today for a follow-up  She continues to be doing well. She is tolerating Arimidex well with minimal hot flashes. No nausea or vomiting no fever or chills or other side effects. No bone pain, no nausea or vomiting. Most recent mammogram on February 2023 and a normal limits             PAST MEDICAL HISTORY: has a past medical history of Chronic kidney disease, stage 3 ([address]), Depression, Diabetes mellitus ([address]), Endometriosis, History of breast cancer, Hyperlipidemia, Hypertension, and Hypothyroidism. PAST SURGICAL HISTORY: has a past surgical history that includes Appendectomy (1993);  Tonsillectomy

## 2023-10-25 ENCOUNTER — HOSPITAL ENCOUNTER (OUTPATIENT)
Dept: PREADMISSION TESTING | Age: 73
Discharge: HOME | End: 2023-10-25
Attending: PODIATRIST | Admitting: PODIATRIST
Payer: MEDICARE

## 2023-10-25 ENCOUNTER — ANESTHESIA EVENT (OUTPATIENT)
Dept: OPERATING ROOM | Age: 73
End: 2023-10-25
Payer: MEDICARE

## 2023-10-25 VITALS
BODY MASS INDEX: 23.9 KG/M2 | OXYGEN SATURATION: 94 % | WEIGHT: 140 LBS | DIASTOLIC BLOOD PRESSURE: 71 MMHG | HEART RATE: 75 BPM | TEMPERATURE: 97.5 F | SYSTOLIC BLOOD PRESSURE: 129 MMHG | RESPIRATION RATE: 16 BRPM | HEIGHT: 64 IN

## 2023-10-25 LAB
ANION GAP SERPL CALCULATED.3IONS-SCNC: 10 MMOL/L (ref 9–17)
BUN SERPL-MCNC: 12 MG/DL (ref 8–23)
BUN/CREAT SERPL: 13 (ref 9–20)
CALCIUM SERPL-MCNC: 10.1 MG/DL (ref 8.6–10.4)
CHLORIDE SERPL-SCNC: 99 MMOL/L (ref 98–107)
CO2 SERPL-SCNC: 30 MMOL/L (ref 20–31)
CREAT SERPL-MCNC: 0.9 MG/DL (ref 0.5–0.9)
EKG ATRIAL RATE: 75 BPM
EKG P AXIS: 58 DEGREES
EKG P-R INTERVAL: 184 MS
EKG Q-T INTERVAL: 368 MS
EKG QRS DURATION: 90 MS
EKG QTC CALCULATION (BAZETT): 410 MS
EKG R AXIS: -12 DEGREES
EKG T AXIS: 32 DEGREES
EKG VENTRICULAR RATE: 75 BPM
ERYTHROCYTE [DISTWIDTH] IN BLOOD BY AUTOMATED COUNT: 11.4 % (ref 11.8–14.4)
GFR SERPL CREATININE-BSD FRML MDRD: >60 ML/MIN/1.73M2
GLUCOSE SERPL-MCNC: 196 MG/DL (ref 70–99)
HCT VFR BLD AUTO: 43 % (ref 36.3–47.1)
HGB BLD-MCNC: 14.6 G/DL (ref 11.9–15.1)
MCH RBC QN AUTO: 30.6 PG (ref 25.2–33.5)
MCHC RBC AUTO-ENTMCNC: 34 G/DL (ref 28.4–34.8)
MCV RBC AUTO: 90.1 FL (ref 82.6–102.9)
NRBC BLD-RTO: 0 PER 100 WBC
PLATELET # BLD AUTO: 185 K/UL (ref 138–453)
PMV BLD AUTO: 9.3 FL (ref 8.1–13.5)
POTASSIUM SERPL-SCNC: 4.3 MMOL/L (ref 3.7–5.3)
RBC # BLD AUTO: 4.77 M/UL (ref 3.95–5.11)
SODIUM SERPL-SCNC: 139 MMOL/L (ref 135–144)
WBC OTHER # BLD: 5.4 K/UL (ref 3.5–11.3)

## 2023-10-25 PROCEDURE — 85027 COMPLETE CBC AUTOMATED: CPT

## 2023-10-25 PROCEDURE — 93005 ELECTROCARDIOGRAM TRACING: CPT | Performed by: PODIATRIST

## 2023-10-25 PROCEDURE — 36415 COLL VENOUS BLD VENIPUNCTURE: CPT

## 2023-10-25 PROCEDURE — 80048 BASIC METABOLIC PNL TOTAL CA: CPT

## 2023-10-25 PROCEDURE — 93010 ELECTROCARDIOGRAM REPORT: CPT | Performed by: INTERNAL MEDICINE

## 2023-10-25 RX ORDER — METFORMIN HYDROCHLORIDE 500 MG/1
500 TABLET, EXTENDED RELEASE ORAL
COMMUNITY

## 2023-10-25 RX ORDER — LEVOTHYROXINE SODIUM 112 UG/1
CAPSULE ORAL DAILY
COMMUNITY

## 2023-10-25 NOTE — PROGRESS NOTES
Pt had a recent cold with resolved symptoms. No antibiotics, steroids used. OK to proceed per anesthesia.

## 2023-11-02 ENCOUNTER — ANESTHESIA (OUTPATIENT)
Dept: OPERATING ROOM | Age: 73
End: 2023-11-02
Payer: MEDICARE

## 2023-11-02 ENCOUNTER — HOSPITAL ENCOUNTER (OUTPATIENT)
Age: 73
Setting detail: OUTPATIENT SURGERY
Discharge: HOME | End: 2023-11-02
Attending: PODIATRIST | Admitting: PODIATRIST
Payer: MEDICARE

## 2023-11-02 VITALS
RESPIRATION RATE: 20 BRPM | HEIGHT: 65 IN | BODY MASS INDEX: 23.59 KG/M2 | WEIGHT: 141.6 LBS | TEMPERATURE: 97.1 F | DIASTOLIC BLOOD PRESSURE: 71 MMHG | SYSTOLIC BLOOD PRESSURE: 152 MMHG | HEART RATE: 79 BPM | OXYGEN SATURATION: 97 %

## 2023-11-02 DIAGNOSIS — M85.672 BONE CYST OF LEFT FOOT: ICD-10-CM

## 2023-11-02 LAB — GLUCOSE BLD-MCNC: 191 MG/DL (ref 74–100)

## 2023-11-02 PROCEDURE — 88304 TISSUE EXAM BY PATHOLOGIST: CPT

## 2023-11-02 PROCEDURE — 7100000011 HC PHASE II RECOVERY - ADDTL 15 MIN: Performed by: PODIATRIST

## 2023-11-02 PROCEDURE — 2580000003 HC RX 258

## 2023-11-02 PROCEDURE — 3700000000 HC ANESTHESIA ATTENDED CARE: Performed by: PODIATRIST

## 2023-11-02 PROCEDURE — 6370000000 HC RX 637 (ALT 250 FOR IP)

## 2023-11-02 PROCEDURE — 2500000003 HC RX 250 WO HCPCS: Performed by: PODIATRIST

## 2023-11-02 PROCEDURE — 82947 ASSAY GLUCOSE BLOOD QUANT: CPT

## 2023-11-02 PROCEDURE — 2500000003 HC RX 250 WO HCPCS: Performed by: NURSE ANESTHETIST, CERTIFIED REGISTERED

## 2023-11-02 PROCEDURE — 2709999900 HC NON-CHARGEABLE SUPPLY: Performed by: PODIATRIST

## 2023-11-02 PROCEDURE — 6360000002 HC RX W HCPCS: Performed by: NURSE ANESTHETIST, CERTIFIED REGISTERED

## 2023-11-02 PROCEDURE — 6360000002 HC RX W HCPCS: Performed by: PODIATRIST

## 2023-11-02 PROCEDURE — 3700000001 HC ADD 15 MINUTES (ANESTHESIA): Performed by: PODIATRIST

## 2023-11-02 PROCEDURE — 7100000010 HC PHASE II RECOVERY - FIRST 15 MIN: Performed by: PODIATRIST

## 2023-11-02 PROCEDURE — 3600000002 HC SURGERY LEVEL 2 BASE: Performed by: PODIATRIST

## 2023-11-02 PROCEDURE — 3600000012 HC SURGERY LEVEL 2 ADDTL 15MIN: Performed by: PODIATRIST

## 2023-11-02 RX ORDER — LIDOCAINE HYDROCHLORIDE AND EPINEPHRINE BITARTRATE 20; .01 MG/ML; MG/ML
INJECTION, SOLUTION SUBCUTANEOUS PRN
Status: DISCONTINUED | OUTPATIENT
Start: 2023-11-02 | End: 2023-11-02 | Stop reason: ALTCHOICE

## 2023-11-02 RX ORDER — SODIUM CHLORIDE 9 MG/ML
INJECTION, SOLUTION INTRAVENOUS PRN
Status: DISCONTINUED | OUTPATIENT
Start: 2023-11-02 | End: 2023-11-02 | Stop reason: HOSPADM

## 2023-11-02 RX ORDER — PROPOFOL 10 MG/ML
INJECTION, EMULSION INTRAVENOUS CONTINUOUS PRN
Status: DISCONTINUED | OUTPATIENT
Start: 2023-11-02 | End: 2023-11-02 | Stop reason: SDUPTHER

## 2023-11-02 RX ORDER — CIPROFLOXACIN 2 MG/ML
400 INJECTION, SOLUTION INTRAVENOUS ONCE
Status: COMPLETED | OUTPATIENT
Start: 2023-11-02 | End: 2023-11-02

## 2023-11-02 RX ORDER — LIDOCAINE HYDROCHLORIDE 20 MG/ML
INJECTION, SOLUTION EPIDURAL; INFILTRATION; INTRACAUDAL; PERINEURAL PRN
Status: DISCONTINUED | OUTPATIENT
Start: 2023-11-02 | End: 2023-11-02 | Stop reason: SDUPTHER

## 2023-11-02 RX ORDER — SODIUM CHLORIDE 0.9 % (FLUSH) 0.9 %
5-40 SYRINGE (ML) INJECTION EVERY 12 HOURS SCHEDULED
Status: DISCONTINUED | OUTPATIENT
Start: 2023-11-02 | End: 2023-11-02 | Stop reason: HOSPADM

## 2023-11-02 RX ORDER — SODIUM CHLORIDE 0.9 % (FLUSH) 0.9 %
5-40 SYRINGE (ML) INJECTION PRN
Status: DISCONTINUED | OUTPATIENT
Start: 2023-11-02 | End: 2023-11-02 | Stop reason: HOSPADM

## 2023-11-02 RX ORDER — ACETAMINOPHEN 325 MG/1
650 TABLET ORAL ONCE
Status: COMPLETED | OUTPATIENT
Start: 2023-11-02 | End: 2023-11-02

## 2023-11-02 RX ORDER — SODIUM CHLORIDE, SODIUM LACTATE, POTASSIUM CHLORIDE, CALCIUM CHLORIDE 600; 310; 30; 20 MG/100ML; MG/100ML; MG/100ML; MG/100ML
INJECTION, SOLUTION INTRAVENOUS CONTINUOUS
Status: DISCONTINUED | OUTPATIENT
Start: 2023-11-02 | End: 2023-11-02 | Stop reason: HOSPADM

## 2023-11-02 RX ORDER — DIMENHYDRINATE 50 MG
50 TABLET ORAL ONCE
Status: COMPLETED | OUTPATIENT
Start: 2023-11-02 | End: 2023-11-02

## 2023-11-02 RX ORDER — FENTANYL CITRATE 50 UG/ML
INJECTION, SOLUTION INTRAMUSCULAR; INTRAVENOUS PRN
Status: DISCONTINUED | OUTPATIENT
Start: 2023-11-02 | End: 2023-11-02 | Stop reason: SDUPTHER

## 2023-11-02 RX ADMIN — LIDOCAINE HYDROCHLORIDE 5 ML: 20 INJECTION, SOLUTION EPIDURAL; INFILTRATION; INTRACAUDAL; PERINEURAL at 08:08

## 2023-11-02 RX ADMIN — ACETAMINOPHEN 650 MG: 325 TABLET ORAL at 07:10

## 2023-11-02 RX ADMIN — SODIUM CHLORIDE, POTASSIUM CHLORIDE, SODIUM LACTATE AND CALCIUM CHLORIDE: 600; 310; 30; 20 INJECTION, SOLUTION INTRAVENOUS at 07:15

## 2023-11-02 RX ADMIN — PROPOFOL 250 MCG/KG/MIN: 10 INJECTION, EMULSION INTRAVENOUS at 08:08

## 2023-11-02 RX ADMIN — DIMENHYDRINATE 50 MG: 50 TABLET ORAL at 07:10

## 2023-11-02 RX ADMIN — FENTANYL CITRATE 50 MCG: 50 INJECTION INTRAMUSCULAR; INTRAVENOUS at 08:22

## 2023-11-02 RX ADMIN — CIPROFLOXACIN 400 MG: 2 INJECTION, SOLUTION INTRAVENOUS at 08:00

## 2023-11-02 NOTE — PROGRESS NOTES
Discharge instructions reviewed with pt and . All questions answered. Pt escorted to private car via wheelchair with belongings in hand.

## 2023-11-02 NOTE — DISCHARGE INSTRUCTIONS
SAME DAY SURGERY DISCHARGE INSTRUCTIONS    1. Do not drive or operate hazardous machinery for 24 hours. 2.  Do not make important personal or business decisions for 24 hours. 3.  Do not drink alcoholic beverages for 24 hours. 4.  Do not smoke tobacco products for 24 hours. 5.  Eat light foods (Jell-O, soups, etc....) and drink plenty of fluids (water, Sprite, etc...) up to 8 glasses per day, as you can tolerate. 6.  If your bandages become soaked with bright red blood, place another dressing pad over your bandages. (DO NOT remove original bandage.)  Call your surgeon for further instructions. A small amount of bright red blood is to be expected. 7.  Limit your activities for 24 hours. Do not engage in heavy work until your surgeon gives you permission. 8.  Patient should not be left alone for 12-24 hours following surgical procedure. 9.  Report the following signs or any questions regarding your physical condition to your surgeon immediately:    Excessive swelling of, or around the wound area. Redness. Temperature of 100 degrees (F) or above. Excessive pain. 10. Call your surgeon for any questions regarding your surgery. POST-OPERATIVE INSTRUCTIONS    Elevate extremity at the level of your heart or above. Ice 30 minutes on, 30 minutes off for the first 24 hours. Do not remove bandages and dressing. Do not get bandages wet. Take pain medications as directed. Resume your regular diet. Any question or concerns please call the office ([phone]). If after hours Dr. Ketan Zhang can be reached at [phone] (home) or [phone] (cell phone).

## 2023-11-02 NOTE — ANESTHESIA POSTPROCEDURE EVALUATION
Department of Anesthesiology  Postprocedure Note    Patient: James Newton  MRN: [id number]  YOB: 1950  Date of evaluation: 11/2/2023      Procedure Summary     Date: 11/02/23 Room / Location: [address] [place] [address]    Anesthesia Start: [address] Anesthesia Stop: 0638    Procedure: FOOT LESION BIOPSY EXCISION-CYST EXCISION (Left: Foot) Diagnosis:       Bone cyst of left foot      (Bone cyst of left foot [M85.672])    Surgeons: Jennifer Sherwood DPM Responsible Provider: VISH Mahoney CRNA    Anesthesia Type: general, TIVA ASA Status: 2          Anesthesia Type: No value filed.     Sunny Phase I: Sunny Score: 10    Sunny Phase II: Sunny Score: 10      Anesthesia Post Evaluation    Patient location during evaluation: bedside  Patient participation: complete - patient participated  Level of consciousness: awake and alert  Airway patency: patent  Nausea & Vomiting: no nausea and no vomiting  Complications: no  Cardiovascular status: hemodynamically stable  Respiratory status: acceptable  Hydration status: stable  Multimodal analgesia pain management approach  Pain management: satisfactory to patient

## 2023-11-02 NOTE — ANESTHESIA POSTPROCEDURE EVALUATION
Department of Anesthesiology  Postprocedure Note    Patient: Win Kapoor  MRN: [id number]  YOB: 1950  Date of evaluation: 11/2/2023      Procedure Summary     Date: 11/02/23 Room / Location: [address] [place] [address]    Anesthesia Start: [address] Anesthesia Stop: 0807    Procedure: FOOT LESION BIOPSY EXCISION-CYST EXCISION (Left: Foot) Diagnosis:       Bone cyst of left foot      (Bone cyst of left foot [M85.672])    Surgeons: Raymond Manley DPM Responsible Provider: VISH Nolen CRNA    Anesthesia Type: general, TIVA ASA Status: 2          Anesthesia Type: No value filed.     Sunny Phase I: Sunny Score: 10    Sunny Phase II: Sunny Score: 10      Anesthesia Post Evaluation    Patient location during evaluation: PACU  Patient participation: complete - patient participated  Level of consciousness: awake and alert  Airway patency: patent  Nausea & Vomiting: no nausea and no vomiting  Complications: no  Cardiovascular status: blood pressure returned to baseline and hemodynamically stable  Respiratory status: acceptable and room air  Hydration status: euvolemic  Pain management: adequate

## 2023-11-02 NOTE — PROGRESS NOTES
Discharge Criteria    Inpatients must meet Criteria 1 through 7. All other patients are either YES or N/A. If a NO is chosen then Anesthesia or Surgeon must be notified. 1.  Minimum 30 minutes after last dose of sedative medication. Yes      2. Systolic BP between 90 - 299. Diastolic BP between 60 - 90. Yes      3. Pulse between 60 - 120    Yes      4. Respirations between 8 - 25. Yes      5. SpO2 92% - 100%. Yes      6. Able to cough and swallow or return to baseline function. Yes      7. Alert and oriented or return to baseline mental status. Yes      8. Demonstrates controlled, coordinated movements, ambulates with steady gait, or return to baseline activity function. Yes      9. Minimal or no pain or nausea, or at a level tolerable and acceptable to patient. Yes      10. Takes and retains oral fluids as allowed. Yes      11. Procedural / perioperative site stable. Minimal or no bleeding. Yes          12. If GI endoscopy procedure, minimal or no abdominal distention or passing flatus. N/A      13. Written discharge instructions and emergency telephone number provided. Yes      14. Accompanied by a responsible adult.     Yes

## 2023-11-05 NOTE — OP NOTE
patient  tolerated the anesthesia and procedure well without complications and  was transported to the recovery room with vital signs stable, afebrile,  in apparent satisfactory condition. Sponge, needle, and instrument  counts were all correct. Estimated blood loss was less than 25 mL.         Chey eLw DPM    D: 11/04/2023 16:27:18       T: 11/05/2023 0:54:07     [email or web address]  Job#: [id number]     Doc#: [id number]    CC:

## 2023-11-06 LAB — SURGICAL PATHOLOGY REPORT: NORMAL

## 2024-02-26 DIAGNOSIS — C50.412 MALIGNANT NEOPLASM OF UPPER-OUTER QUADRANT OF LEFT BREAST IN FEMALE, ESTROGEN RECEPTOR POSITIVE (HCC): Primary | ICD-10-CM

## 2024-02-26 DIAGNOSIS — Z17.0 MALIGNANT NEOPLASM OF UPPER-OUTER QUADRANT OF LEFT BREAST IN FEMALE, ESTROGEN RECEPTOR POSITIVE (HCC): Primary | ICD-10-CM

## 2024-03-04 ENCOUNTER — HOSPITAL ENCOUNTER (OUTPATIENT)
Dept: WOMENS IMAGING | Age: 74
Discharge: HOME | End: 2024-03-06
Payer: MEDICARE

## 2024-03-04 ENCOUNTER — HOSPITAL ENCOUNTER (OUTPATIENT)
Age: 74
Discharge: HOME | End: 2024-03-04
Payer: MEDICARE

## 2024-03-04 DIAGNOSIS — C50.412 MALIGNANT NEOPLASM OF UPPER-OUTER QUADRANT OF LEFT BREAST IN FEMALE, ESTROGEN RECEPTOR POSITIVE (HCC): ICD-10-CM

## 2024-03-04 DIAGNOSIS — Z17.0 MALIGNANT NEOPLASM OF UPPER-OUTER QUADRANT OF LEFT BREAST IN FEMALE, ESTROGEN RECEPTOR POSITIVE (HCC): ICD-10-CM

## 2024-03-04 DIAGNOSIS — R79.89 LFTS ABNORMAL: ICD-10-CM

## 2024-03-04 LAB
ALBUMIN SERPL-MCNC: 4.5 G/DL (ref 3.5–5.2)
ALBUMIN/GLOB SERPL: 1.7 {RATIO} (ref 1–2.5)
ALP SERPL-CCNC: 110 U/L (ref 35–104)
ALT SERPL-CCNC: 49 U/L (ref 5–33)
ANION GAP SERPL CALCULATED.3IONS-SCNC: 11 MMOL/L (ref 9–17)
AST SERPL-CCNC: 40 U/L
BASOPHILS # BLD: 0.14 K/UL (ref 0–0.2)
BASOPHILS NFR BLD: 2 % (ref 0–2)
BILIRUB SERPL-MCNC: 0.4 MG/DL (ref 0.3–1.2)
BUN SERPL-MCNC: 14 MG/DL (ref 8–23)
BUN/CREAT SERPL: 18 (ref 9–20)
CALCIUM SERPL-MCNC: 9.6 MG/DL (ref 8.6–10.4)
CHLORIDE SERPL-SCNC: 99 MMOL/L (ref 98–107)
CO2 SERPL-SCNC: 26 MMOL/L (ref 20–31)
CREAT SERPL-MCNC: 0.8 MG/DL (ref 0.5–0.9)
EOSINOPHIL # BLD: 1.11 K/UL (ref 0–0.44)
EOSINOPHILS RELATIVE PERCENT: 14 % (ref 1–4)
ERYTHROCYTE [DISTWIDTH] IN BLOOD BY AUTOMATED COUNT: 11.9 % (ref 11.8–14.4)
GFR SERPL CREATININE-BSD FRML MDRD: >60 ML/MIN/1.73M2
GLUCOSE SERPL-MCNC: 181 MG/DL (ref 70–99)
HCT VFR BLD AUTO: 43.7 % (ref 36.3–47.1)
HGB BLD-MCNC: 15.1 G/DL (ref 11.9–15.1)
IMM GRANULOCYTES # BLD AUTO: 0.03 K/UL (ref 0–0.3)
IMM GRANULOCYTES NFR BLD: 0 %
LYMPHOCYTES NFR BLD: 1.87 K/UL (ref 1.1–3.7)
LYMPHOCYTES RELATIVE PERCENT: 23 % (ref 24–43)
MCH RBC QN AUTO: 31.3 PG (ref 25.2–33.5)
MCHC RBC AUTO-ENTMCNC: 34.6 G/DL (ref 28.4–34.8)
MCV RBC AUTO: 90.5 FL (ref 82.6–102.9)
MONOCYTES NFR BLD: 0.72 K/UL (ref 0.1–1.2)
MONOCYTES NFR BLD: 9 % (ref 3–12)
NEUTROPHILS NFR BLD: 52 % (ref 36–65)
NEUTS SEG NFR BLD: 4.23 K/UL (ref 1.5–8.1)
NRBC BLD-RTO: 0 PER 100 WBC
PLATELET # BLD AUTO: 263 K/UL (ref 138–453)
PMV BLD AUTO: 9.2 FL (ref 8.1–13.5)
POTASSIUM SERPL-SCNC: 4.4 MMOL/L (ref 3.7–5.3)
PROT SERPL-MCNC: 7.1 G/DL (ref 6.4–8.3)
RBC # BLD AUTO: 4.83 M/UL (ref 3.95–5.11)
SODIUM SERPL-SCNC: 136 MMOL/L (ref 135–144)
WBC OTHER # BLD: 8.1 K/UL (ref 3.5–11.3)

## 2024-03-04 PROCEDURE — 36415 COLL VENOUS BLD VENIPUNCTURE: CPT

## 2024-03-04 PROCEDURE — 85025 COMPLETE CBC W/AUTO DIFF WBC: CPT

## 2024-03-04 PROCEDURE — G0279 TOMOSYNTHESIS, MAMMO: HCPCS

## 2024-03-04 PROCEDURE — 80053 COMPREHEN METABOLIC PANEL: CPT

## 2024-03-21 ENCOUNTER — OFFICE VISIT (OUTPATIENT)
Dept: ONCOLOGY | Age: 74
End: 2024-03-21
Payer: MEDICARE

## 2024-03-21 VITALS
TEMPERATURE: 97.5 F | HEART RATE: 84 BPM | DIASTOLIC BLOOD PRESSURE: 75 MMHG | BODY MASS INDEX: 24.45 KG/M2 | WEIGHT: 138 LBS | RESPIRATION RATE: 18 BRPM | SYSTOLIC BLOOD PRESSURE: 143 MMHG

## 2024-03-21 DIAGNOSIS — Z17.0 MALIGNANT NEOPLASM OF UPPER-OUTER QUADRANT OF LEFT BREAST IN FEMALE, ESTROGEN RECEPTOR POSITIVE (HCC): Primary | ICD-10-CM

## 2024-03-21 DIAGNOSIS — Z12.31 SCREENING MAMMOGRAM FOR HIGH-RISK PATIENT: Primary | ICD-10-CM

## 2024-03-21 DIAGNOSIS — C50.412 MALIGNANT NEOPLASM OF UPPER-OUTER QUADRANT OF LEFT BREAST IN FEMALE, ESTROGEN RECEPTOR POSITIVE (HCC): Primary | ICD-10-CM

## 2024-03-21 DIAGNOSIS — R79.89 LFT ELEVATION: ICD-10-CM

## 2024-03-21 PROCEDURE — G8484 FLU IMMUNIZE NO ADMIN: HCPCS | Performed by: INTERNAL MEDICINE

## 2024-03-21 PROCEDURE — 1036F TOBACCO NON-USER: CPT | Performed by: INTERNAL MEDICINE

## 2024-03-21 PROCEDURE — G8427 DOCREV CUR MEDS BY ELIG CLIN: HCPCS | Performed by: INTERNAL MEDICINE

## 2024-03-21 PROCEDURE — G8420 CALC BMI NORM PARAMETERS: HCPCS | Performed by: INTERNAL MEDICINE

## 2024-03-21 PROCEDURE — 99211 OFF/OP EST MAY X REQ PHY/QHP: CPT | Performed by: INTERNAL MEDICINE

## 2024-03-21 PROCEDURE — 3078F DIAST BP <80 MM HG: CPT | Performed by: INTERNAL MEDICINE

## 2024-03-21 PROCEDURE — G8399 PT W/DXA RESULTS DOCUMENT: HCPCS | Performed by: INTERNAL MEDICINE

## 2024-03-21 PROCEDURE — 1090F PRES/ABSN URINE INCON ASSESS: CPT | Performed by: INTERNAL MEDICINE

## 2024-03-21 PROCEDURE — 1123F ACP DISCUSS/DSCN MKR DOCD: CPT | Performed by: INTERNAL MEDICINE

## 2024-03-21 PROCEDURE — 3017F COLORECTAL CA SCREEN DOC REV: CPT | Performed by: INTERNAL MEDICINE

## 2024-03-21 PROCEDURE — 3077F SYST BP >= 140 MM HG: CPT | Performed by: INTERNAL MEDICINE

## 2024-03-21 PROCEDURE — 99214 OFFICE O/P EST MOD 30 MIN: CPT | Performed by: INTERNAL MEDICINE

## 2024-03-21 NOTE — PROGRESS NOTES
recurrent  Examination today showed stable examination with a scar in the left breast.    No evidence of metastatic disease or recurrent disease  Most recent mammogram on March 2024 in normal limits> 12 months mammogram  LFTs's statement repeat LFTs in 12 months  Rtc in 12 months with lab and mamogram                                          Trinity Luo MD                          [facility]                            This note is created with the assistance of a speech recognition program.  While intending to generate a document that actually reflects the content of the visit, the document can still have some errors including those of syntax and sound a like substitutions which may escape proof reading.  It such instances, actual meaning can be extrapolated by contextual diversion.

## 2024-06-24 PROBLEM — R79.89 LFTS ABNORMAL: Status: RESOLVED | Noted: 2023-09-14 | Resolved: 2024-06-24

## 2024-10-08 NOTE — PATIENT INSTRUCTIONS
RV 6 months
CONSTITUTIONAL: awakein no acute distress.   SKIN: abrasion to L 5th MCP skin with some yellowish discharge  HEAD: Normocephalic; atraumatic.  EYES: no conjunctival injection. no scleral icterus   ENT: No nasal discharge; airway clear.  NECK: Supple; non tender.  CARD: S1, S2 normal; no murmurs, gallops, or rubs. Regular rate and rhythm.   RESP: No wheezes, rales or rhonchi. Good air movement bilaterally.   ABD: soft LUQ ttp without rebound. no guarding, no distention, no rigidity.   EXT: passive ROM L 5th MCP causes pain. good sensation distally, good cap refill. L chest wall TTP without bony deformity.  NEURO: Alert, oriented, grossly unremarkable  PSYCH: Cooperative, appropriate.

## 2025-03-05 ENCOUNTER — HOSPITAL ENCOUNTER (OUTPATIENT)
Dept: WOMENS IMAGING | Age: 75
Discharge: HOME | End: 2025-03-07
Payer: MEDICARE

## 2025-03-05 VITALS — BODY MASS INDEX: 23.05 KG/M2 | HEIGHT: 64 IN | WEIGHT: 135 LBS

## 2025-03-05 DIAGNOSIS — Z12.31 ENCOUNTER FOR SCREENING MAMMOGRAM FOR HIGH-RISK PATIENT: ICD-10-CM

## 2025-03-05 PROCEDURE — 77067 SCR MAMMO BI INCL CAD: CPT

## 2025-03-13 LAB
ALBUMIN: 4.4 G/DL (ref 3.5–5.2)
ALK PHOSPHATASE: 85 U/L (ref 30–146)
ALT SERPL-CCNC: 45 U/L (ref 5–33)
ANION GAP SERPL CALCULATED.3IONS-SCNC: 16 MMOL/L (ref 7–16)
AST SERPL-CCNC: 34 U/L (ref 9–40)
BASOPHILS ABSOLUTE: 0.15 K/UL (ref 0–0.2)
BASOPHILS RELATIVE PERCENT: 2.3 % (ref 0–2)
BILIRUB SERPL-MCNC: 0.4 MG/DL
BUN BLDV-MCNC: 12 MG/DL (ref 8–23)
CALCIUM SERPL-MCNC: 9.8 MG/DL (ref 8.6–10.5)
CHLORIDE BLD-SCNC: 101 MMOL/L (ref 96–107)
CO2: 25 MMOL/L (ref 18–32)
CREAT SERPL-MCNC: 0.91 MG/DL (ref 0.51–1.15)
EGFR IF NONAFRICAN AMERICAN: 67 ML/MIN/1.73M2
EOSINOPHILS ABSOLUTE: 0.76 K/UL (ref 0–0.8)
EOSINOPHILS RELATIVE PERCENT: 11.4 % (ref 0–5)
GLUCOSE: 191 MG/DL (ref 65–125)
HCT VFR BLD CALC: 41.7 % (ref 35–47)
HEMOGLOBIN: 14.2 G/DL (ref 11.9–16)
IMMATURE GRANS (ABS): 0.03 K/UL (ref 0–0.06)
IMMATURE GRANULOCYTES %: 0.5 % (ref 0–2)
LYMPHOCYTES ABSOLUTE: 1.64 K/UL (ref 0.9–5.2)
LYMPHOCYTES RELATIVE PERCENT: 24.6 % (ref 20–45)
MCH RBC QN AUTO: 31 PG (ref 26–33)
MCHC RBC AUTO-ENTMCNC: 34.1 G/DL (ref 32–35)
MCV RBC AUTO: 91 FL (ref 75–100)
MONOCYTES ABSOLUTE: 0.56 K/UL (ref 0.1–1)
MONOCYTES RELATIVE PERCENT: 8.4 % (ref 0–13)
NEUTROPHILS ABSOLUTE: 3.52 K/UL (ref 1.9–8)
NEUTROPHILS RELATIVE PERCENT: 52.8 % (ref 45–75)
PDW BLD-RTO: 11.8 % (ref 11.2–14.8)
PLATELET # BLD: 265 THOUS/CMM (ref 140–440)
POTASSIUM SERPL-SCNC: 4.3 MMOL/L (ref 3.5–5.4)
RBC # BLD: 4.58 MILL/CMM (ref 3.8–5.2)
SODIUM BLD-SCNC: 142 MMOL/L (ref 135–148)
TOTAL PROTEIN: 6.4 G/DL (ref 6–8.3)
WBC # BLD: 6.7 THDS/CMM (ref 3.6–11)

## 2025-03-17 ENCOUNTER — OFFICE VISIT (OUTPATIENT)
Dept: ONCOLOGY | Age: 75
End: 2025-03-17
Payer: MEDICARE

## 2025-03-17 VITALS
BODY MASS INDEX: 24.45 KG/M2 | HEART RATE: 81 BPM | HEIGHT: 63 IN | DIASTOLIC BLOOD PRESSURE: 70 MMHG | SYSTOLIC BLOOD PRESSURE: 138 MMHG | TEMPERATURE: 98 F | RESPIRATION RATE: 18 BRPM | WEIGHT: 138 LBS

## 2025-03-17 DIAGNOSIS — C50.412 MALIGNANT NEOPLASM OF UPPER-OUTER QUADRANT OF LEFT BREAST IN FEMALE, ESTROGEN RECEPTOR POSITIVE: Primary | ICD-10-CM

## 2025-03-17 DIAGNOSIS — Z85.3 HISTORY OF BREAST CANCER: ICD-10-CM

## 2025-03-17 DIAGNOSIS — Z12.31 SCREENING MAMMOGRAM FOR BREAST CANCER: ICD-10-CM

## 2025-03-17 DIAGNOSIS — Z17.0 MALIGNANT NEOPLASM OF UPPER-OUTER QUADRANT OF LEFT BREAST IN FEMALE, ESTROGEN RECEPTOR POSITIVE: Primary | ICD-10-CM

## 2025-03-17 DIAGNOSIS — R79.89 LFT ELEVATION: Primary | ICD-10-CM

## 2025-03-17 PROCEDURE — 1090F PRES/ABSN URINE INCON ASSESS: CPT | Performed by: INTERNAL MEDICINE

## 2025-03-17 PROCEDURE — G8427 DOCREV CUR MEDS BY ELIG CLIN: HCPCS | Performed by: INTERNAL MEDICINE

## 2025-03-17 PROCEDURE — 99213 OFFICE O/P EST LOW 20 MIN: CPT | Performed by: INTERNAL MEDICINE

## 2025-03-17 PROCEDURE — 1036F TOBACCO NON-USER: CPT | Performed by: INTERNAL MEDICINE

## 2025-03-17 PROCEDURE — G8420 CALC BMI NORM PARAMETERS: HCPCS | Performed by: INTERNAL MEDICINE

## 2025-03-17 PROCEDURE — 1159F MED LIST DOCD IN RCRD: CPT | Performed by: INTERNAL MEDICINE

## 2025-03-17 PROCEDURE — 99211 OFF/OP EST MAY X REQ PHY/QHP: CPT | Performed by: INTERNAL MEDICINE

## 2025-03-17 PROCEDURE — 3078F DIAST BP <80 MM HG: CPT | Performed by: INTERNAL MEDICINE

## 2025-03-17 PROCEDURE — 3017F COLORECTAL CA SCREEN DOC REV: CPT | Performed by: INTERNAL MEDICINE

## 2025-03-17 PROCEDURE — 1123F ACP DISCUSS/DSCN MKR DOCD: CPT | Performed by: INTERNAL MEDICINE

## 2025-03-17 PROCEDURE — G8399 PT W/DXA RESULTS DOCUMENT: HCPCS | Performed by: INTERNAL MEDICINE

## 2025-03-17 PROCEDURE — 1126F AMNT PAIN NOTED NONE PRSNT: CPT | Performed by: INTERNAL MEDICINE

## 2025-03-17 PROCEDURE — 3075F SYST BP GE 130 - 139MM HG: CPT | Performed by: INTERNAL MEDICINE

## 2025-03-17 NOTE — PROGRESS NOTES
breastfeeding. HEENT: Normocephalic and atraumatic. Pupils are equal, round, reactive to light and accommodation. Extraocular muscles are intact. Neck: Showed no JVD, no carotid bruit . Lungs: Clear to auscultation bilaterally. Heart: Regular without any murmur. Abdomen: Soft, nontender. No hepatosplenomegaly. Extremities: Lower extremities show no edema, clubbing, or cyanosis. Breasts: Left-sided status post lumpectomy and sentinel lymph node biopsy.  No adenopathy was appreciated.  As discussed above, there is a small 7 to 10 mm hard area inferior to her incision between the incision and areola, attached to skin.  Right breast is free of any masses or adenopathy.Neuro exam: intact cranial nerves bilaterally no motor or sensory deficit, gait is normal. Lymphatic: no adenopathy appreciated in the supraclavicular, axillary, cervical or inguinal area    Review of radiological studies:   Mamm 6/2020  Impression    Stable post treatment changes in the left breast.  No evidence of malignancy.    Advise annual screening mammography.         BI-RADS 2         BIRADS:    BIRADS - CATEGORY 2      Diagnostic mamm 12/2020  Impression   No evidence for malignancy.       BIRADS:   BIRADS - CATEGORY 2       Benign, no evidence of malignancy.  Patient will be due for screening   mammogram in 6 months.       OVERALL ASSESSMENT - BENIGN        IMPRESSION:   Stage TIa invasive ductal carcinoma of the left breast with areas of DCIS, ER positive, MI negative.  Status post lumpectomy and radiation.  Good performance status and minimal comorbidities  Adjuvant Arimidex which was started back in May 2017 and done by 2023  Woman bone density scan on March 2022 in the normal limit  High LFTs     Plan:   The patient continues to do very well  No evidence of recurrent disease, mammogram was reviewed, no evidence of recurrent  Examination today showed stable examination with a scar in the left breast.    No evidence of metastatic disease or

## 2025-03-18 ENCOUNTER — HOSPITAL ENCOUNTER (OUTPATIENT)
Dept: PHYSICAL THERAPY | Age: 75
Setting detail: THERAPIES SERIES
Discharge: HOME | End: 2025-03-18
Payer: MEDICARE

## 2025-03-18 PROCEDURE — 97161 PT EVAL LOW COMPLEX 20 MIN: CPT

## 2025-03-18 PROCEDURE — 97110 THERAPEUTIC EXERCISES: CPT

## 2025-03-20 NOTE — PLAN OF CARE
[facility]           Phone: [phone]             Outpatient Physical Therapy  Fax: [phone]                                           Date: 3/18/2025  Patient: Charisma Villatoro : 1950 [facility] #: [id number]   Referring Physician: Phil Joyner DPM      [x] Plan of Care   [] Updated Plan of Care    Dates of Service to Include: 3/18/2025 to 25    Diagnosis:  R Anterior tibialis tendinitis M76.811, R posterior tibial tendinitis, M76.821     Rehab (Treatment) Diagnosis:  R posterior tibial tendinitis         Onset Date:  25    Attendance  Total # of Visits to Date: 1 No Show: 0 Canceled Appointment: 0    Assessment  Body Structures, Functions, Activity Limitations Requiring Skilled Therapeutic Intervention: Decreased functional mobility , Decreased ADL status, Decreased ROM, Decreased strength, Decreased endurance, Decreased balance, Decreased high-level IADLs, Increased pain  Assessment: The patient is a 75 y.o. female who reports a 2.5 month history of an insidious onset of medial dorsal foot pain.  On evaluation she has a pes planus foot type, decreased R ankle ROM with pain on inversion and eversion, decreased R ankle strength with discomfort and pain with palpation of the muscle belly and tendon of her posterior tibialis.  She would benefit from skilled PT to address her deficits to decrease pain with functional mobility.      Goals  Short Term Goals  Time Frame for Short Term Goals: 2 weeks  Short Term Goal 1: Patient will be initiated with a HEP  Short Term Goal 2: Patient will tolerate manual interventions and/or modalities to decrease pain.  Long Term Goals  Time Frame for Long Term Goals : 4 weeks  Long Term Goal 1: Patient will be independent and compliant with a HEP.  Long Term Goal 2: Patient will improve R ankle ROM to match L for ADLs.  Long Term Goal 3: Patient will improve R ankle

## 2025-03-20 NOTE — PROGRESS NOTES
to match L for ADLs.  Long Term Goal 3: Patient will improve R ankle strength to >/= 4/5 in all planes to decrease pain with functional mobility.  Long Term Goal 4: Patient will report decreased pain to <4/10 at worst.  Long Term Goal 5: Patient will report 70% improvement in overall symptoms and function.    Patient Goals : To get better    Minutes Tracking:  Time In: 1055  Time Out: 1142  Minutes: 47  Timed Code Treatment Minutes: 45 Minutes      Da Rosado PT, DPT, OCS, Cert. DN     3/18/2025  Electronically signed by Da Rosado PT on 3/20/2025 at 8:15 AM

## 2025-03-21 ENCOUNTER — HOSPITAL ENCOUNTER (OUTPATIENT)
Dept: PHYSICAL THERAPY | Age: 75
Setting detail: THERAPIES SERIES
Discharge: HOME | End: 2025-03-21
Payer: MEDICARE

## 2025-03-21 PROCEDURE — 97140 MANUAL THERAPY 1/> REGIONS: CPT

## 2025-03-21 PROCEDURE — 97110 THERAPEUTIC EXERCISES: CPT

## 2025-03-21 NOTE — PROGRESS NOTES
Short Term Goals: 2 weeks  Short Term Goal 1: Patient will be initiated with a HEP  Short Term Goal 2: Patient will tolerate manual interventions and/or modalities to decrease pain.    Long Term Goals  Time Frame for Long Term Goals : 4 weeks  Long Term Goal 1: Patient will be independent and compliant with a HEP.  Long Term Goal 2: Patient will improve R ankle ROM to match L for ADLs.  Long Term Goal 3: Patient will improve R ankle strength to >/= 4/5 in all planes to decrease pain with functional mobility.  Long Term Goal 4: Patient will report decreased pain to <4/10 at worst.  Long Term Goal 5: Patient will report 70% improvement in overall symptoms and function.    Minutes Tracking:  Time In: 0900  Time Out: 0941  Minutes: 41      Laurel Ashley PTA     Date: 3/21/2025

## 2025-03-24 ENCOUNTER — HOSPITAL ENCOUNTER (OUTPATIENT)
Dept: PHYSICAL THERAPY | Age: 75
Setting detail: THERAPIES SERIES
Discharge: HOME | End: 2025-03-24
Payer: MEDICARE

## 2025-03-24 PROCEDURE — 97110 THERAPEUTIC EXERCISES: CPT

## 2025-03-24 PROCEDURE — 97140 MANUAL THERAPY 1/> REGIONS: CPT

## 2025-03-24 NOTE — PROGRESS NOTES
Phone: [phone]                 [facility]           Fax: [phone]                           Outpatient Physical Therapy                                                                            Daily Note    Patient: Charisma Villatoro : 1950  [facility] #: [id number]   Referring Physician: Phil Joyner DPM  Date: 3/24/2025    Treatment Diagnosis: R posterior tibial tendinitis    Onset Date: 25  PT Insurance Information: Medicare/[employer] Medicare supplement  Total # of Visits Approved: 8 Per Physician Order  Total # of Visits to Date: 2  No Show: 0  Canceled Appointment: 0    25 Plan of Care/Recert Due    Pre-Treatment Pain:  3-4/10  Subjective: Pt reports pain of plantar surface of R foot 3-4/10. Stated that she did a lot of stairs over the weekend and her foot was sore afterwards. States no pain at rest.    Exercises:  Exercise 1: HEP: Gastroc stretch 2x30 seconds, ankle eversion/inversion ROM x20, seated arch lifts 10 sec hold x10 (all twice per day)  Exercise 2: SciFit bike lvl 1 x10 minutes  Exercise 3: Standing and seated SB stretch 2x30\" ea  Exercise 4: Seated rockerboard x15 ea direction // seated toe/heel raises, seated arch lifts x20  Exercise 5: standing heel/toe raises x20  Exercise 6: reclined ankle ROM with BTB x15 // CW and CCW x10 ea  Exercise 7: SLS 2x30\" on R LE with 1 finger support    Assessment  Assessment: Continued with ROM and strengthening this date. Pt with mild discomfort with performance of inversion ROM with band. Initiated SLS with 1 finger support with mild unsteadiness but no LOB. Continued with manual and PROM with a decrease in pain post session. Will continue to progress as tolerated.    Activity Tolerance  Activity Tolerance: Patient tolerated treatment well    Patient Education  Patient Education: PT POC, HEP  Pt verbalized/demonstrated good understanding:     [x] Yes         [] No, pt required further clarification.      Post Treatment Pain:

## 2025-03-27 ENCOUNTER — HOSPITAL ENCOUNTER (OUTPATIENT)
Dept: PHYSICAL THERAPY | Age: 75
Setting detail: THERAPIES SERIES
Discharge: HOME | End: 2025-03-27
Payer: MEDICARE

## 2025-03-27 ENCOUNTER — APPOINTMENT (OUTPATIENT)
Dept: PHYSICAL THERAPY | Age: 75
End: 2025-03-27
Payer: MEDICARE

## 2025-03-27 PROCEDURE — 97140 MANUAL THERAPY 1/> REGIONS: CPT

## 2025-03-27 PROCEDURE — 97110 THERAPEUTIC EXERCISES: CPT

## 2025-03-27 NOTE — PROGRESS NOTES
Phone: [phone]                 [facility]           Fax: [phone]                           Outpatient Physical Therapy                                                                            Daily Note    Patient: Charisma Villatoro : 1950  [facility] #: [id number]   Referring Physician: Phil Joyner DPM  Date: 3/27/2025    Treatment Diagnosis: R posterior tibial tendinitis    Onset Date: 25  PT Insurance Information: Medicare/[employer] Medicare supplement  Total # of Visits Approved: 8 Per Physician Order  Total # of Visits to Date: 3  No Show: 0  Canceled Appointment: 0    25 Plan of Care/Recert Due    Pre-Treatment Pain:  0/10  Subjective: Pt arrives with little to no pain. States that she has mild discomfort of the plantar and medial area of the foot that tends to come and go. States that he feels like things are getting better for her.    Exercises:  Exercise 1: HEP: Gastroc stretch 2x30 seconds, ankle eversion/inversion ROM x20, seated arch lifts 10 sec hold x10 (all twice per day)  Exercise 2: SciFit bike lvl 1.5 x10 minutes  Exercise 3: Standing and seated SB stretch 2x30\" ea--standing this date  Exercise 4: Seated rockerboard x15 ea direction // seated toe/heel raises, seated arch lifts p64--oxwj lifts this date  Exercise 5: standing heel/toe raises x20 // SL toe raise 2x10  Exercise 6: reclined ankle ROM with BTB x15 // reclined seated gastroc stretch with towel 2x30\"  Exercise 8: R LE FSU/LSU x15 ea  Exercise 9: Foot roller 2x10    Manual:  Other: PROM of R ankle    Assessment  Assessment: Continued with charted therex with VC's on proper technique and posture--good carryover noted. Initiated SL toe raise with mild discomfort with performance. Attempted marble pick-ups with pt unable to complete at this time, able to get x1 marble in cup. Initiated foot roller with relief post performance. Continued with manual with a decrease in discomfort. Will continue to progress as

## 2025-04-01 ENCOUNTER — HOSPITAL ENCOUNTER (OUTPATIENT)
Dept: PHYSICAL THERAPY | Age: 75
Setting detail: THERAPIES SERIES
Discharge: HOME | End: 2025-04-01
Payer: MEDICARE

## 2025-04-01 PROCEDURE — 97140 MANUAL THERAPY 1/> REGIONS: CPT

## 2025-04-01 PROCEDURE — 97110 THERAPEUTIC EXERCISES: CPT

## 2025-04-02 NOTE — PROGRESS NOTES
Phone: [phone]                 [facility]           Fax: [phone]                           Outpatient Physical Therapy                                                                            Daily Note    Patient: Charisma Villatoro : 1950  [facility] #: [id number]   Referring Physician: Phil Joyner DPM  Date: 2025    Diagnosis: R Anterior tibialis tendinitis M76.811, R posterior tibial tendinitis, M76.821  Treatment Diagnosis: R posterior tibial tendinitis    Onset Date: 25  PT Insurance Information: Medicare/[employer] Medicare supplement  Total # of Visits Approved: 8 Per Physician Order  Total # of Visits to Date: 4  No Show: 0  Canceled Appointment: 0    25 Plan of Care/Recert Due    Pre-Treatment Pain:  2/10  Subjective: Pt states she is feeling better overall. States her arch still hurtsat night , but is much better    Exercises:  Exercise 1: HEP: Gastroc stretch 2x30 seconds, ankle eversion/inversion ROM x20, seated arch lifts 10 sec hold x10 (all twice per day)  Exercise 2: SciFit bike lvl 1.5 x10 minutes  Exercise 3: Standing and seated SB stretch 2x30\" ea--standing this date  Exercise 4: Seated rockerboard x15 ea direction // seated toe/heel raises, seated arch lifts b81--ogzx lifts this date  Exercise 5: standing heel/toe raises x20 // SL toe raise 2x10  Exercise 6: reclined ankle ROM with BTB x15 // reclined seated gastroc stretch with towel 2x30\"  Exercise 7: SLS 2x30\" on R LE with 1 finger support  Exercise 8: R LE FSU/LSU x15 ea  Exercise 9: Foot roller 2x10    Manual:  Soft Tissue Mobilizaton: Myofascial to plantar asspect of foot     Assessment  Assessment: Pt is feeling better. Tender over plantar aspect of foot. Strengthening and stretching performed. Ankle strength is 4+/5. DF is 10-12 deg. Added additioal stretching to HEP.    Activity Tolerance  Activity Tolerance: Patient tolerated treatment well    Patient Education  Patient Education: HEP

## 2025-04-03 ENCOUNTER — HOSPITAL ENCOUNTER (OUTPATIENT)
Dept: PHYSICAL THERAPY | Age: 75
Setting detail: THERAPIES SERIES
Discharge: HOME | End: 2025-04-03
Payer: MEDICARE

## 2025-04-03 PROCEDURE — 97140 MANUAL THERAPY 1/> REGIONS: CPT

## 2025-04-03 PROCEDURE — 97110 THERAPEUTIC EXERCISES: CPT

## 2025-04-08 ENCOUNTER — HOSPITAL ENCOUNTER (OUTPATIENT)
Dept: PHYSICAL THERAPY | Age: 75
Setting detail: THERAPIES SERIES
Discharge: HOME | End: 2025-04-08
Payer: MEDICARE

## 2025-04-08 PROCEDURE — 97140 MANUAL THERAPY 1/> REGIONS: CPT

## 2025-04-08 PROCEDURE — 97110 THERAPEUTIC EXERCISES: CPT

## 2025-04-09 NOTE — PROGRESS NOTES
Short Term Goals  Time Frame for Short Term Goals: 2 weeks  Short Term Goal 1: Patient will be initiated with a HEP--MET  Short Term Goal 2: Patient will tolerate manual interventions and/or modalities to decrease pain.--MET    Long Term Goals  Time Frame for Long Term Goals : 4 weeks  Long Term Goal 1: Patient will be independent and compliant with a HEP.  Long Term Goal 2: Patient will improve R ankle ROM to match L for ADLs.  Long Term Goal 3: Patient will improve R ankle strength to >/= 4/5 in all planes to decrease pain with functional mobility.  Long Term Goal 4: Patient will report decreased pain to <4/10 at worst.  Long Term Goal 5: Patient will report 70% improvement in overall symptoms and function.    Minutes Tracking:  Time In: 1602  Time Out: 1648  Minutes: 46  Timed Code Treatment Minutes: 44 Minutes    Fabian López     Date: 4/8/2025

## 2025-04-10 ENCOUNTER — HOSPITAL ENCOUNTER (OUTPATIENT)
Dept: PHYSICAL THERAPY | Age: 75
Setting detail: THERAPIES SERIES
Discharge: HOME | End: 2025-04-10
Payer: MEDICARE

## 2025-04-10 PROCEDURE — 97110 THERAPEUTIC EXERCISES: CPT

## 2025-04-10 NOTE — PROGRESS NOTES
Phone: [phone]                 [facility]           Fax: [phone]                           Outpatient Physical Therapy                                                                            Daily Note    Patient: Charisma Villatoro : 1950  [facility] #: [id number]   Referring Physician: Phil Joyner DPM  Date: 4/10/2025    Treatment Diagnosis: R posterior tibial tendinitis    Onset Date: 25  PT Insurance Information: Medicare/[employer] Medicare supplement  Total # of Visits Approved: 8 Per Physician Order  Total # of Visits to Date: 7  No Show: 0  Canceled Appointment: 0    25 Plan of Care/Recert Due    Pre-Treatment Pain:  0/10  Subjective: Pt reports little to no pain prior to session.    Exercises:  Exercise 1: HEP: Gastroc stretch 2x30 seconds, ankle eversion/inversion ROM x20, seated arch lifts 10 sec hold x10 (all twice per day)  Exercise 2: SciFit bike lvl 2.0 x10 minutes  Exercise 3: Standing and seated SB stretch 2x45\" ea  Exercise 5: standing heel/toe raises x20 // SL toe raise 2x10  Exercise 6: reclined ankle ROM with PTB x20 // reclined seated gastroc stretch with towel 2x30\"  Exercise 7: SLS 3x30\" on R LE with 1 finger support with foam pad  Exercise 8: R LE FSU/LSU x15 ea// SD 15x  Exercise 10: sit to stands 15x  Exercise 11: Standing marches with YTB around feet 2x10 R LE    Assessment  Assessment: Increased reps and resistance with therex this date--good carryover noted. Initiated marches with YTB around feet and VC's on technique. Pt with mild discomfort with performance of inversion ROM. Will continue to progress as tolerated.    Activity Tolerance  Activity Tolerance: Patient tolerated treatment well    Patient Education  Patient Education: HEP  Pt verbalized/demonstrated good understanding:     [x] Yes         [] No, pt required further clarification.      Post Treatment Pain:  0/10      Plan  Plan Frequency: 2  Plan weeks: 4       Goals  (Total # of Visits to

## 2025-04-15 ENCOUNTER — HOSPITAL ENCOUNTER (OUTPATIENT)
Dept: PHYSICAL THERAPY | Age: 75
Setting detail: THERAPIES SERIES
Discharge: HOME | End: 2025-04-15
Payer: MEDICARE

## 2025-04-15 PROCEDURE — 97140 MANUAL THERAPY 1/> REGIONS: CPT

## 2025-04-15 PROCEDURE — 97110 THERAPEUTIC EXERCISES: CPT

## 2025-04-16 NOTE — PROGRESS NOTES
Phone: [phone]                 [facility]           Fax: [phone]                           Outpatient Physical Therapy                                                                            Daily Note    Patient: Charisma Villatoro : 1950  [facility] #: [id number]   Referring Physician: Phil Joyner DPM  Date: 4/15/2025    Diagnosis: R Anterior tibialis tendinitis M76.811, R posterior tibial tendinitis, M76.821  Treatment Diagnosis: R posterior tibial tendinitis    Onset Date: 25  PT Insurance Information: Medicare/[employer] Medicare supplement  Total # of Visits Approved: 8 Per Physician Order  Total # of Visits to Date: 8  No Show: 0  Canceled Appointment: 0    25 Plan of Care/Recert Due    Pre-Treatment Pain:  1/10  Subjective: Pt states she has very little pain and pleased by current progress    Exercises:  Exercise 1: HEP: Gastroc stretch 2x30 seconds, ankle eversion/inversion ROM x20, seated arch lifts 10 sec hold x10 (all twice per day)  Exercise 2: SciFit bike lvl 2.0 x10 minutes  Exercise 3: Standing and seated SB stretch 2x45\" ea  Exercise 4: Seated rockerboard x20 ea direction // seated toe/heel raises, seated arch lifts x20--no arch lifts this date  Exercise 6: reclined ankle ROM with PTB x20 // reclined seated gastroc stretch with towel 2x30\"  Exercise 7: SLS 3x30\" on R LE with 1 finger support with foam pad  Exercise 8: R LE FSU/LSU x15 ea// SD 15x  Exercise 9: Foot roller 2x10  Exercise 10: sit to stands 15x    Manual:  Soft Tissue Mobilizaton: Myofascial to plantar asspect of foot  Other: PROM of R ankle    Assessment  Assessment: Pt is feeling much better. Min/no TTP over PTT and plantar fasciia. Doing well with exercise with strength 4+/5 ankle. Pt has one session remaining    Activity Tolerance  Activity Tolerance: Patient tolerated treatment well    Patient Education  Patient Education: HEP  Pt verbalized/demonstrated good understanding:     [x] Yes         []

## 2025-04-17 ENCOUNTER — HOSPITAL ENCOUNTER (OUTPATIENT)
Dept: PHYSICAL THERAPY | Age: 75
Setting detail: THERAPIES SERIES
End: 2025-04-17
Payer: MEDICARE

## 2025-04-22 ENCOUNTER — HOSPITAL ENCOUNTER (OUTPATIENT)
Dept: PHYSICAL THERAPY | Age: 75
Setting detail: THERAPIES SERIES
Discharge: HOME | End: 2025-04-22
Payer: MEDICARE

## 2025-04-22 PROCEDURE — 97110 THERAPEUTIC EXERCISES: CPT

## 2025-04-22 NOTE — DISCHARGE SUMMARY
Phone: [phone]                 [facility]          Fax: [phone]                            Outpatient Physical Therapy                                                                    Discharge Summary    Patient: Charisma Villatoro  : 1950  [facility] #: [id number]   Referring Physician: Phil Joyner DPM  Diagnosis: R Anterior tibialis tendinitis M76.811, R posterior tibial tendinitis, M76.821  Treatment Diagnosis: R posterior tibial tendinitis      Date Treatment Initiated: 3/18/25  Date of Last Treatment: 25      PT Visit Information  Onset Date: 25  PT Insurance Information: Medicare/[employer] Medicare supplement  Total # of Visits Approved: 8  Total # of Visits to Date: 9  Plan of Care/Certification Expiration Date: 25  No Show: 0  Progress Note Due Date: 25  Canceled Appointment: 0  Progress Note Counter: RTD: Nothing scheduled at this time.      Frequency/Duration  Plan Frequency: 2 times per week  Plan weeks: 4 weeks      Treatment Received  [x] HP/CP      [x] Electrical Stim   [x] Therapeutic Exercise      [x] Gait Training  [] Aquatics   [] Ultrasound         [x] Patient Education/HEP   [x] Manual Therapy  [] Traction    [x] Neuro-adi        [x] Soft Tissue Mobs            [x] Therapeutic Act  [] Iontophoresis    [] Orthotic casting/fitting      [] Dry Needling  [] Vasopneumatic Compression     [] Vasopneumatic Compression/Cold    Assessment  Assessment: The patient has attended her initial evaluation and 8 follow-up appointments working toward her long term goals.  She reports she is 100% better overall and reports she maybe had a 1/10 pain at night earlier this week.  She demonstrates full R ankle ROM without pain and demonstrates improving ankle strength.  She is currently independent with a HEP and was educated on the importance of continuing.       Goals  Short Term Goals  Time Frame for Short Term Goals: 2 weeks  Short Term Goal 1: Patient will be

## 2025-04-22 NOTE — PROGRESS NOTES
Phone: [phone]                 [facility]           Fax: [phone]                           Outpatient Physical Therapy                                                                            Daily Note    Patient: Charisma Villatoro : 1950  [facility] #: [id number]   Referring Physician: Phil Joyner DPM  Date: 2025    Diagnosis: R Anterior tibialis tendinitis M76.811, R posterior tibial tendinitis, M76.821  Treatment Diagnosis: R posterior tibial tendinitis    Onset Date: 25  PT Insurance Information: Medicare/[employer] Medicare supplement  Total # of Visits Approved: 8 Per Physician Order  Total # of Visits to Date: 9  No Show: 0  Canceled Appointment: 0    25 Plan of Care/Recert Due    Pre-Treatment Pain:  0/10  Subjective: Patient denies pain coming into therapy.  She reports her foot is 100% back to normal.    Exercises:  Exercise 2: [employer] bike lvl 2.0 x5 minutes  Measures obtained for physician update/discharge    Assessment  Body Structures, Functions, Activity Limitations Requiring Skilled Therapeutic Intervention: Decreased functional mobility , Decreased ADL status, Decreased ROM, Decreased strength, Decreased endurance, Decreased balance, Decreased high-level IADLs, Increased pain  Assessment: The patient has attended her initial evaluation and 8 follow-up appointments working toward her long term goals.  She reports she is 100% better overall and reports she maybe had a 1/10 pain at night earlier this week.  She demonstrates full R ankle ROM without pain and demonstrates improving ankle strength.  She is currently independent with a HEP and was educated on the importance of continuing.    Activity Tolerance  Activity Tolerance: Patient tolerated treatment well    Patient Education  Patient Education: HEP  Pt verbalized/demonstrated good understanding:     [x] Yes         [] No, pt required further clarification.       Post Treatment Pain:  0/10      Plan  Plan

## 2025-05-21 ENCOUNTER — HOSPITAL ENCOUNTER (OUTPATIENT)
Age: 75
Setting detail: SPECIMEN
Discharge: HOME | End: 2025-05-21

## 2025-05-21 LAB — 25(OH)D3 SERPL-MCNC: 31.7 NG/ML (ref 30–100)

## 2025-06-02 ENCOUNTER — CLINICAL DOCUMENTATION (OUTPATIENT)
Dept: PHYSICAL THERAPY | Age: 75
End: 2025-06-02

## 2025-07-01 ENCOUNTER — HOSPITAL ENCOUNTER (OUTPATIENT)
Age: 75
Discharge: HOME | End: 2025-07-01
Payer: MEDICARE

## 2025-07-01 DIAGNOSIS — E03.9 HYPOTHYROIDISM, UNSPECIFIED TYPE: ICD-10-CM

## 2025-07-01 DIAGNOSIS — I10 ESSENTIAL HYPERTENSION, BENIGN: ICD-10-CM

## 2025-07-01 DIAGNOSIS — E78.2 MIXED HYPERLIPIDEMIA: ICD-10-CM

## 2025-07-01 DIAGNOSIS — E11.8 CONTROLLED DIABETES MELLITUS TYPE 2 WITH COMPLICATIONS, UNSPECIFIED WHETHER LONG TERM INSULIN USE (HCC): ICD-10-CM

## 2025-07-01 LAB
ALT SERPL-CCNC: 56 U/L (ref 10–35)
ANION GAP SERPL CALCULATED.3IONS-SCNC: 11 MMOL/L (ref 9–16)
AST SERPL-CCNC: 40 U/L (ref 10–35)
BUN SERPL-MCNC: 14 MG/DL (ref 8–23)
BUN/CREAT SERPL: 14 (ref 9–20)
CALCIUM SERPL-MCNC: 9.7 MG/DL (ref 8.6–10.4)
CHLORIDE SERPL-SCNC: 101 MMOL/L (ref 98–107)
CHOLEST SERPL-MCNC: 149 MG/DL (ref 0–199)
CHOLESTEROL/HDL RATIO: 3.3
CO2 SERPL-SCNC: 27 MMOL/L (ref 20–31)
CREAT SERPL-MCNC: 1 MG/DL (ref 0.5–0.9)
CREAT UR-MCNC: 39.1 MG/DL (ref 28–217)
ERYTHROCYTE [DISTWIDTH] IN BLOOD BY AUTOMATED COUNT: 12 % (ref 11.8–14.4)
EST. AVERAGE GLUCOSE BLD GHB EST-MCNC: 154 MG/DL
GFR, ESTIMATED: 60 ML/MIN/1.73M2
GLUCOSE SERPL-MCNC: 167 MG/DL (ref 74–99)
HBA1C MFR BLD: 7 % (ref 4–6)
HCT VFR BLD AUTO: 42 % (ref 36.3–47.1)
HGB BLD-MCNC: 14.1 G/DL (ref 11.9–15.1)
LDLC SERPL CALC-MCNC: 66 MG/DL (ref 0–100)
MCH RBC QN AUTO: 30.5 PG (ref 25.2–33.5)
MCHC RBC AUTO-ENTMCNC: 33.6 G/DL (ref 28.4–34.8)
MCV RBC AUTO: 90.9 FL (ref 82.6–102.9)
MICROALBUMIN UR-MCNC: <12 MG/L (ref 0–20)
NRBC BLD-RTO: 0 PER 100 WBC
PLATELET # BLD AUTO: 238 K/UL (ref 138–453)
PMV BLD AUTO: 9.5 FL (ref 8.1–13.5)
POTASSIUM SERPL-SCNC: 4.7 MMOL/L (ref 3.7–5.3)
RBC # BLD AUTO: 4.62 M/UL (ref 3.95–5.11)
SODIUM SERPL-SCNC: 139 MMOL/L (ref 136–145)
T3FREE SERPL-MCNC: 2.96 PG/ML (ref 2–4.4)
T4 FREE SERPL-MCNC: 1.3 NG/DL (ref 0.92–1.68)
TSH SERPL DL<=0.05 MIU/L-ACNC: 2.6 UIU/ML (ref 0.27–4.2)
VLDLC SERPL CALC-MCNC: 38 MG/DL (ref 1–30)

## 2025-07-01 PROCEDURE — 84450 TRANSFERASE (AST) (SGOT): CPT

## 2025-07-01 PROCEDURE — 84481 FREE ASSAY (FT-3): CPT

## 2025-07-01 PROCEDURE — 84439 ASSAY OF FREE THYROXINE: CPT

## 2025-07-01 PROCEDURE — 80048 BASIC METABOLIC PNL TOTAL CA: CPT

## 2025-07-01 PROCEDURE — 84460 ALANINE AMINO (ALT) (SGPT): CPT

## 2025-07-01 PROCEDURE — 83036 HEMOGLOBIN GLYCOSYLATED A1C: CPT

## 2025-07-01 PROCEDURE — 84443 ASSAY THYROID STIM HORMONE: CPT

## 2025-07-01 PROCEDURE — 82043 UR ALBUMIN QUANTITATIVE: CPT

## 2025-07-01 PROCEDURE — 82570 ASSAY OF URINE CREATININE: CPT

## 2025-07-01 PROCEDURE — 80061 LIPID PANEL: CPT

## 2025-07-01 PROCEDURE — 36415 COLL VENOUS BLD VENIPUNCTURE: CPT

## 2025-07-01 PROCEDURE — 85027 COMPLETE CBC AUTOMATED: CPT

## (undated) DEVICE — MERCY TIFFIN HAND: Brand: MEDLINE INDUSTRIES, INC.

## (undated) DEVICE — DRESSING,GAUZE,XEROFORM,CURAD,1"X8",ST: Brand: CURAD

## (undated) DEVICE — Device

## (undated) DEVICE — GOWN,AURORA,NON-REINFORCED,2XL: Brand: MEDLINE

## (undated) DEVICE — CANNULA IV 18GA L15IN BLNT FILL LUERLOCK HUB MJCT

## (undated) DEVICE — SYRINGE, LUER LOCK, 10ML: Brand: MEDLINE

## (undated) DEVICE — SUTURE ETHLN SZ 3-0 L18IN NONABSORBABLE BLK L24MM PS-1 3/8 1663G

## (undated) DEVICE — CUFF REPROCESS BP TOURN SING PORT SING BLDR 4X18IN

## (undated) DEVICE — YANKAUER,FLEXIBLE HANDLE,REGLR CAPACITY: Brand: MEDLINE INDUSTRIES, INC.

## (undated) DEVICE — SPONGE GZ W4XL4IN COT 12 PLY TYP VII WVN C FLD DSGN STERILE

## (undated) DEVICE — SOLUTION IRRIG 1000ML 0.9% SOD CHL USP POUR PLAS BTL

## (undated) DEVICE — TUBING, SUCTION, 9/32" X 12', STRAIGHT: Brand: MEDLINE INDUSTRIES, INC.

## (undated) DEVICE — BANDAGE,GAUZE,BULKEE II,4.5"X4.1YD,STRL: Brand: MEDLINE

## (undated) DEVICE — BLADE,STAINLESS-STEEL,15,STRL,DISPOSABLE: Brand: MEDLINE

## (undated) DEVICE — NEEDLE HYPO 25GA L1.5IN BLU POLYPR HUB S STL REG BVL STR

## (undated) DEVICE — OCCLUSIVE GAUZE STRIP,3% BISMUTH TRIBROMOPHENATE IN PETROLATUM BLEND: Brand: XEROFORM

## (undated) DEVICE — GLOVE ORANGE PI 8 1/2   MSG9085

## (undated) DEVICE — BLADE OPHTH 180DEG CUT SURF BLU STR SHRP DBL BVL GRINDLESS